# Patient Record
Sex: FEMALE | Race: WHITE | Employment: OTHER | URBAN - METROPOLITAN AREA
[De-identification: names, ages, dates, MRNs, and addresses within clinical notes are randomized per-mention and may not be internally consistent; named-entity substitution may affect disease eponyms.]

---

## 2017-07-13 PROBLEM — Z95.2 S/P AVR (AORTIC VALVE REPLACEMENT): Status: ACTIVE | Noted: 2017-07-13

## 2017-07-13 PROBLEM — I10 ESSENTIAL HYPERTENSION: Status: ACTIVE | Noted: 2017-07-13

## 2017-07-13 PROBLEM — I25.10 CAD (CORONARY ARTERY DISEASE): Status: ACTIVE | Noted: 2017-07-13

## 2017-07-13 PROBLEM — E03.9 ACQUIRED HYPOTHYROIDISM: Status: ACTIVE | Noted: 2017-07-13

## 2017-07-13 PROBLEM — E66.9 OBESITY: Status: ACTIVE | Noted: 2017-07-13

## 2017-07-13 PROBLEM — I49.3 FREQUENT PVCS: Status: ACTIVE | Noted: 2017-07-13

## 2019-03-06 ENCOUNTER — APPOINTMENT (OUTPATIENT)
Dept: CT IMAGING | Age: 74
DRG: 683 | End: 2019-03-06
Payer: MEDICARE

## 2019-03-06 ENCOUNTER — HOSPITAL ENCOUNTER (INPATIENT)
Age: 74
LOS: 2 days | Discharge: HOME | DRG: 683 | End: 2019-03-10
Attending: EMERGENCY MEDICINE | Admitting: INTERNAL MEDICINE
Payer: MEDICARE

## 2019-03-06 ENCOUNTER — APPOINTMENT (OUTPATIENT)
Dept: GENERAL RADIOLOGY | Age: 74
DRG: 683 | End: 2019-03-06
Payer: MEDICARE

## 2019-03-06 DIAGNOSIS — N17.9 ACUTE RENAL FAILURE SUPERIMPOSED ON CHRONIC KIDNEY DISEASE, UNSPECIFIED CKD STAGE, UNSPECIFIED ACUTE RENAL FAILURE TYPE (HCC): Primary | ICD-10-CM

## 2019-03-06 DIAGNOSIS — N18.9 ACUTE RENAL FAILURE SUPERIMPOSED ON CHRONIC KIDNEY DISEASE, UNSPECIFIED CKD STAGE, UNSPECIFIED ACUTE RENAL FAILURE TYPE (HCC): Primary | ICD-10-CM

## 2019-03-06 LAB
ALBUMIN SERPL-MCNC: 4.1 G/DL (ref 3.5–5.2)
ALP BLD-CCNC: 66 U/L (ref 35–104)
ALT SERPL-CCNC: 11 U/L (ref 0–32)
AMMONIA: 31.8 UMOL/L (ref 11–51)
ANION GAP SERPL CALCULATED.3IONS-SCNC: 14 MMOL/L (ref 7–16)
APTT: 32.7 SEC (ref 24.5–35.1)
AST SERPL-CCNC: 14 U/L (ref 0–31)
BASOPHILS ABSOLUTE: 0.02 E9/L (ref 0–0.2)
BASOPHILS RELATIVE PERCENT: 0.3 % (ref 0–2)
BILIRUB SERPL-MCNC: 0.3 MG/DL (ref 0–1.2)
BILIRUBIN DIRECT: <0.2 MG/DL (ref 0–0.3)
BILIRUBIN URINE: NEGATIVE
BILIRUBIN, INDIRECT: NORMAL MG/DL (ref 0–1)
BLOOD, URINE: NEGATIVE
BUN BLDV-MCNC: 40 MG/DL (ref 8–23)
CALCIUM SERPL-MCNC: 9.8 MG/DL (ref 8.6–10.2)
CHLORIDE BLD-SCNC: 93 MMOL/L (ref 98–107)
CLARITY: CLEAR
CO2: 25 MMOL/L (ref 22–29)
COLOR: YELLOW
CREAT SERPL-MCNC: 2.6 MG/DL (ref 0.5–1)
EOSINOPHILS ABSOLUTE: 0.43 E9/L (ref 0.05–0.5)
EOSINOPHILS RELATIVE PERCENT: 6.8 % (ref 0–6)
GFR AFRICAN AMERICAN: 22
GFR NON-AFRICAN AMERICAN: 18 ML/MIN/1.73
GLUCOSE BLD-MCNC: 104 MG/DL (ref 74–99)
GLUCOSE URINE: NEGATIVE MG/DL
HCT VFR BLD CALC: 39.3 % (ref 34–48)
HEMOGLOBIN: 12.4 G/DL (ref 11.5–15.5)
IMMATURE GRANULOCYTES #: 0.04 E9/L
IMMATURE GRANULOCYTES %: 0.6 % (ref 0–5)
INFLUENZA A BY PCR: NOT DETECTED
INFLUENZA B BY PCR: NOT DETECTED
INR BLD: 2
KETONES, URINE: NEGATIVE MG/DL
LACTIC ACID: 1.1 MMOL/L (ref 0.5–2.2)
LEUKOCYTE ESTERASE, URINE: NEGATIVE
LIPASE: 26 U/L (ref 13–60)
LYMPHOCYTES ABSOLUTE: 0.51 E9/L (ref 1.5–4)
LYMPHOCYTES RELATIVE PERCENT: 8 % (ref 20–42)
MAGNESIUM: 2.3 MG/DL (ref 1.6–2.6)
MCH RBC QN AUTO: 28.5 PG (ref 26–35)
MCHC RBC AUTO-ENTMCNC: 31.6 % (ref 32–34.5)
MCV RBC AUTO: 90.3 FL (ref 80–99.9)
MONOCYTES ABSOLUTE: 0.41 E9/L (ref 0.1–0.95)
MONOCYTES RELATIVE PERCENT: 6.4 % (ref 2–12)
NEUTROPHILS ABSOLUTE: 4.95 E9/L (ref 1.8–7.3)
NEUTROPHILS RELATIVE PERCENT: 77.9 % (ref 43–80)
NITRITE, URINE: NEGATIVE
OVALOCYTES: ABNORMAL
PDW BLD-RTO: 15.4 FL (ref 11.5–15)
PH UA: 6.5 (ref 5–9)
PLATELET # BLD: 284 E9/L (ref 130–450)
PMV BLD AUTO: 10.8 FL (ref 7–12)
POIKILOCYTES: ABNORMAL
POLYCHROMASIA: ABNORMAL
POTASSIUM REFLEX MAGNESIUM: 4.3 MMOL/L (ref 3.5–5)
PROTEIN UA: NEGATIVE MG/DL
PROTHROMBIN TIME: 23 SEC (ref 9.3–12.4)
RBC # BLD: 4.35 E12/L (ref 3.5–5.5)
SODIUM BLD-SCNC: 132 MMOL/L (ref 132–146)
SPECIFIC GRAVITY UA: 1.01 (ref 1–1.03)
TOTAL PROTEIN: 7.8 G/DL (ref 6.4–8.3)
TROPONIN: <0.01 NG/ML (ref 0–0.03)
TSH SERPL DL<=0.05 MIU/L-ACNC: 0.71 UIU/ML (ref 0.27–4.2)
UROBILINOGEN, URINE: 0.2 E.U./DL
WBC # BLD: 6.4 E9/L (ref 4.5–11.5)

## 2019-03-06 PROCEDURE — 99285 EMERGENCY DEPT VISIT HI MDM: CPT

## 2019-03-06 PROCEDURE — 99219 PR INITIAL OBSERVATION CARE/DAY 50 MINUTES: CPT | Performed by: INTERNAL MEDICINE

## 2019-03-06 PROCEDURE — G0378 HOSPITAL OBSERVATION PER HR: HCPCS

## 2019-03-06 PROCEDURE — 81003 URINALYSIS AUTO W/O SCOPE: CPT

## 2019-03-06 PROCEDURE — 83735 ASSAY OF MAGNESIUM: CPT

## 2019-03-06 PROCEDURE — 85610 PROTHROMBIN TIME: CPT

## 2019-03-06 PROCEDURE — 87502 INFLUENZA DNA AMP PROBE: CPT

## 2019-03-06 PROCEDURE — 80076 HEPATIC FUNCTION PANEL: CPT

## 2019-03-06 PROCEDURE — 85025 COMPLETE CBC W/AUTO DIFF WBC: CPT

## 2019-03-06 PROCEDURE — 70450 CT HEAD/BRAIN W/O DYE: CPT

## 2019-03-06 PROCEDURE — 85730 THROMBOPLASTIN TIME PARTIAL: CPT

## 2019-03-06 PROCEDURE — 80048 BASIC METABOLIC PNL TOTAL CA: CPT

## 2019-03-06 PROCEDURE — 83690 ASSAY OF LIPASE: CPT

## 2019-03-06 PROCEDURE — 71045 X-RAY EXAM CHEST 1 VIEW: CPT

## 2019-03-06 PROCEDURE — 82140 ASSAY OF AMMONIA: CPT

## 2019-03-06 PROCEDURE — 83605 ASSAY OF LACTIC ACID: CPT

## 2019-03-06 PROCEDURE — 84484 ASSAY OF TROPONIN QUANT: CPT

## 2019-03-06 PROCEDURE — 87088 URINE BACTERIA CULTURE: CPT

## 2019-03-06 PROCEDURE — 84443 ASSAY THYROID STIM HORMONE: CPT

## 2019-03-06 PROCEDURE — 93005 ELECTROCARDIOGRAM TRACING: CPT | Performed by: EMERGENCY MEDICINE

## 2019-03-06 ASSESSMENT — PAIN SCALES - GENERAL: PAINLEVEL_OUTOF10: 0

## 2019-03-07 PROBLEM — R26.9 ABNORMAL GAIT: Status: ACTIVE | Noted: 2019-03-07

## 2019-03-07 PROBLEM — E11.22 TYPE 2 DIABETES MELLITUS WITH CHRONIC KIDNEY DISEASE, WITH LONG-TERM CURRENT USE OF INSULIN (HCC): Chronic | Status: ACTIVE | Noted: 2019-03-06

## 2019-03-07 PROBLEM — Z79.4 TYPE 2 DIABETES MELLITUS WITH CHRONIC KIDNEY DISEASE, WITH LONG-TERM CURRENT USE OF INSULIN (HCC): Chronic | Status: ACTIVE | Noted: 2019-03-06

## 2019-03-07 PROBLEM — I48.0 PAROXYSMAL ATRIAL FIBRILLATION (HCC): Chronic | Status: ACTIVE | Noted: 2019-03-06

## 2019-03-07 PROBLEM — E78.5 HYPERLIPIDEMIA: Chronic | Status: ACTIVE | Noted: 2019-03-06

## 2019-03-07 PROBLEM — N18.9 ACUTE KIDNEY INJURY SUPERIMPOSED ON CKD (HCC): Status: ACTIVE | Noted: 2019-03-06

## 2019-03-07 PROBLEM — E03.9 ACQUIRED HYPOTHYROIDISM: Chronic | Status: ACTIVE | Noted: 2017-07-13

## 2019-03-07 PROBLEM — I51.3 INTRACARDIAC THROMBUS: Chronic | Status: ACTIVE | Noted: 2019-03-06

## 2019-03-07 PROBLEM — I10 ESSENTIAL HYPERTENSION: Chronic | Status: ACTIVE | Noted: 2017-07-13

## 2019-03-07 PROBLEM — M10.9 GOUT: Chronic | Status: ACTIVE | Noted: 2019-03-06

## 2019-03-07 LAB
ANION GAP SERPL CALCULATED.3IONS-SCNC: 16 MMOL/L (ref 7–16)
BASOPHILS ABSOLUTE: 0.02 E9/L (ref 0–0.2)
BASOPHILS RELATIVE PERCENT: 0.4 % (ref 0–2)
BUN BLDV-MCNC: 40 MG/DL (ref 8–23)
CALCIUM SERPL-MCNC: 9.6 MG/DL (ref 8.6–10.2)
CHLORIDE BLD-SCNC: 93 MMOL/L (ref 98–107)
CO2: 22 MMOL/L (ref 22–29)
CREAT SERPL-MCNC: 2.6 MG/DL (ref 0.5–1)
EKG ATRIAL RATE: 69 BPM
EKG P AXIS: 28 DEGREES
EKG P-R INTERVAL: 202 MS
EKG Q-T INTERVAL: 396 MS
EKG QRS DURATION: 116 MS
EKG QTC CALCULATION (BAZETT): 424 MS
EKG R AXIS: 36 DEGREES
EKG T AXIS: 90 DEGREES
EKG VENTRICULAR RATE: 69 BPM
EOSINOPHILS ABSOLUTE: 0.39 E9/L (ref 0.05–0.5)
EOSINOPHILS RELATIVE PERCENT: 7.7 % (ref 0–6)
GFR AFRICAN AMERICAN: 22
GFR NON-AFRICAN AMERICAN: 18 ML/MIN/1.73
GLUCOSE BLD-MCNC: 134 MG/DL (ref 74–99)
HCT VFR BLD CALC: 37.8 % (ref 34–48)
HEMOGLOBIN: 12.1 G/DL (ref 11.5–15.5)
IMMATURE GRANULOCYTES #: 0.04 E9/L
IMMATURE GRANULOCYTES %: 0.8 % (ref 0–5)
INR BLD: 2.2
LYMPHOCYTES ABSOLUTE: 0.67 E9/L (ref 1.5–4)
LYMPHOCYTES RELATIVE PERCENT: 13.3 % (ref 20–42)
MAGNESIUM: 2.2 MG/DL (ref 1.6–2.6)
MCH RBC QN AUTO: 28.7 PG (ref 26–35)
MCHC RBC AUTO-ENTMCNC: 32 % (ref 32–34.5)
MCV RBC AUTO: 89.6 FL (ref 80–99.9)
METER GLUCOSE: 132 MG/DL (ref 74–99)
METER GLUCOSE: 181 MG/DL (ref 74–99)
MONOCYTES ABSOLUTE: 0.38 E9/L (ref 0.1–0.95)
MONOCYTES RELATIVE PERCENT: 7.5 % (ref 2–12)
NEUTROPHILS ABSOLUTE: 3.55 E9/L (ref 1.8–7.3)
NEUTROPHILS RELATIVE PERCENT: 70.3 % (ref 43–80)
PDW BLD-RTO: 15.5 FL (ref 11.5–15)
PLATELET # BLD: 249 E9/L (ref 130–450)
PMV BLD AUTO: 10.5 FL (ref 7–12)
POTASSIUM SERPL-SCNC: 4.2 MMOL/L (ref 3.5–5)
PROTHROMBIN TIME: 24.4 SEC (ref 9.3–12.4)
RBC # BLD: 4.22 E12/L (ref 3.5–5.5)
SODIUM BLD-SCNC: 131 MMOL/L (ref 132–146)
WBC # BLD: 5.1 E9/L (ref 4.5–11.5)

## 2019-03-07 PROCEDURE — 85025 COMPLETE CBC W/AUTO DIFF WBC: CPT

## 2019-03-07 PROCEDURE — 6370000000 HC RX 637 (ALT 250 FOR IP): Performed by: INTERNAL MEDICINE

## 2019-03-07 PROCEDURE — 2580000003 HC RX 258: Performed by: INTERNAL MEDICINE

## 2019-03-07 PROCEDURE — 36415 COLL VENOUS BLD VENIPUNCTURE: CPT

## 2019-03-07 PROCEDURE — 82962 GLUCOSE BLOOD TEST: CPT

## 2019-03-07 PROCEDURE — 83735 ASSAY OF MAGNESIUM: CPT

## 2019-03-07 PROCEDURE — 80048 BASIC METABOLIC PNL TOTAL CA: CPT

## 2019-03-07 PROCEDURE — G0378 HOSPITAL OBSERVATION PER HR: HCPCS

## 2019-03-07 PROCEDURE — 85610 PROTHROMBIN TIME: CPT

## 2019-03-07 PROCEDURE — 99225 PR SBSQ OBSERVATION CARE/DAY 25 MINUTES: CPT | Performed by: INTERNAL MEDICINE

## 2019-03-07 RX ORDER — HYDRALAZINE HYDROCHLORIDE 10 MG/1
10 TABLET, FILM COATED ORAL 3 TIMES DAILY
Status: DISCONTINUED | OUTPATIENT
Start: 2019-03-07 | End: 2019-03-09

## 2019-03-07 RX ORDER — ACETAMINOPHEN 325 MG/1
650 TABLET ORAL EVERY 4 HOURS PRN
Status: DISCONTINUED | OUTPATIENT
Start: 2019-03-07 | End: 2019-03-10 | Stop reason: HOSPADM

## 2019-03-07 RX ORDER — SODIUM CHLORIDE 0.9 % (FLUSH) 0.9 %
10 SYRINGE (ML) INJECTION EVERY 12 HOURS SCHEDULED
Status: DISCONTINUED | OUTPATIENT
Start: 2019-03-07 | End: 2019-03-10 | Stop reason: HOSPADM

## 2019-03-07 RX ORDER — WARFARIN SODIUM 3 MG/1
3 TABLET ORAL
Status: DISCONTINUED | OUTPATIENT
Start: 2019-03-08 | End: 2019-03-10 | Stop reason: HOSPADM

## 2019-03-07 RX ORDER — INSULIN GLARGINE 100 [IU]/ML
30 INJECTION, SOLUTION SUBCUTANEOUS 2 TIMES DAILY
Status: DISCONTINUED | OUTPATIENT
Start: 2019-03-07 | End: 2019-03-10 | Stop reason: HOSPADM

## 2019-03-07 RX ORDER — WARFARIN SODIUM 2 MG/1
2 TABLET ORAL
Status: ON HOLD | COMMUNITY
End: 2019-08-21 | Stop reason: HOSPADM

## 2019-03-07 RX ORDER — FLUOXETINE HYDROCHLORIDE 20 MG/1
20 CAPSULE ORAL DAILY
Status: DISCONTINUED | OUTPATIENT
Start: 2019-03-07 | End: 2019-03-10 | Stop reason: HOSPADM

## 2019-03-07 RX ORDER — FENOFIBRATE 160 MG/1
160 TABLET ORAL DAILY
Status: DISCONTINUED | OUTPATIENT
Start: 2019-03-07 | End: 2019-03-09

## 2019-03-07 RX ORDER — ONDANSETRON 2 MG/ML
4 INJECTION INTRAMUSCULAR; INTRAVENOUS EVERY 6 HOURS PRN
Status: DISCONTINUED | OUTPATIENT
Start: 2019-03-07 | End: 2019-03-10 | Stop reason: HOSPADM

## 2019-03-07 RX ORDER — CETIRIZINE HYDROCHLORIDE 10 MG/1
5 TABLET ORAL DAILY
Status: DISCONTINUED | OUTPATIENT
Start: 2019-03-07 | End: 2019-03-10 | Stop reason: HOSPADM

## 2019-03-07 RX ORDER — DOCUSATE SODIUM 100 MG/1
500 CAPSULE, LIQUID FILLED ORAL DAILY
Status: DISCONTINUED | OUTPATIENT
Start: 2019-03-07 | End: 2019-03-10 | Stop reason: HOSPADM

## 2019-03-07 RX ORDER — PANTOPRAZOLE SODIUM 40 MG/1
40 TABLET, DELAYED RELEASE ORAL
Status: DISCONTINUED | OUTPATIENT
Start: 2019-03-07 | End: 2019-03-10 | Stop reason: HOSPADM

## 2019-03-07 RX ORDER — WARFARIN SODIUM 2 MG/1
3 TABLET ORAL
Status: ON HOLD | COMMUNITY
End: 2019-08-21 | Stop reason: HOSPADM

## 2019-03-07 RX ORDER — CLOPIDOGREL BISULFATE 75 MG/1
75 TABLET ORAL DAILY
Status: DISCONTINUED | OUTPATIENT
Start: 2019-03-07 | End: 2019-03-10 | Stop reason: HOSPADM

## 2019-03-07 RX ORDER — WARFARIN SODIUM 2 MG/1
2 TABLET ORAL
Status: DISCONTINUED | OUTPATIENT
Start: 2019-03-07 | End: 2019-03-10 | Stop reason: HOSPADM

## 2019-03-07 RX ORDER — SODIUM CHLORIDE 0.9 % (FLUSH) 0.9 %
10 SYRINGE (ML) INJECTION PRN
Status: DISCONTINUED | OUTPATIENT
Start: 2019-03-07 | End: 2019-03-10 | Stop reason: HOSPADM

## 2019-03-07 RX ORDER — SODIUM CHLORIDE 9 MG/ML
INJECTION, SOLUTION INTRAVENOUS CONTINUOUS
Status: DISCONTINUED | OUTPATIENT
Start: 2019-03-07 | End: 2019-03-08

## 2019-03-07 RX ORDER — LEVOTHYROXINE SODIUM 0.12 MG/1
125 TABLET ORAL DAILY
Status: DISCONTINUED | OUTPATIENT
Start: 2019-03-07 | End: 2019-03-10 | Stop reason: HOSPADM

## 2019-03-07 RX ADMIN — SODIUM CHLORIDE: 9 INJECTION, SOLUTION INTRAVENOUS at 21:56

## 2019-03-07 RX ADMIN — Medication 10 ML: at 09:09

## 2019-03-07 RX ADMIN — METOPROLOL SUCCINATE 75 MG: 50 TABLET, EXTENDED RELEASE ORAL at 10:40

## 2019-03-07 RX ADMIN — INSULIN GLARGINE 30 UNITS: 100 INJECTION, SOLUTION SUBCUTANEOUS at 20:34

## 2019-03-07 RX ADMIN — PANTOPRAZOLE SODIUM 40 MG: 40 TABLET, DELAYED RELEASE ORAL at 05:48

## 2019-03-07 RX ADMIN — FLUOXETINE 20 MG: 20 CAPSULE ORAL at 09:03

## 2019-03-07 RX ADMIN — Medication 10 ML: at 20:31

## 2019-03-07 RX ADMIN — LEVOTHYROXINE SODIUM 125 MCG: 125 TABLET ORAL at 05:48

## 2019-03-07 RX ADMIN — FENOFIBRATE 160 MG: 160 TABLET ORAL at 09:04

## 2019-03-07 RX ADMIN — INSULIN GLARGINE 30 UNITS: 100 INJECTION, SOLUTION SUBCUTANEOUS at 09:06

## 2019-03-07 RX ADMIN — HYDRALAZINE HYDROCHLORIDE 10 MG: 10 TABLET, FILM COATED ORAL at 20:30

## 2019-03-07 RX ADMIN — HYDRALAZINE HYDROCHLORIDE 10 MG: 10 TABLET, FILM COATED ORAL at 10:41

## 2019-03-07 RX ADMIN — DOCUSATE SODIUM 100 MG: 100 CAPSULE, LIQUID FILLED ORAL at 09:05

## 2019-03-07 RX ADMIN — HYDRALAZINE HYDROCHLORIDE 10 MG: 10 TABLET, FILM COATED ORAL at 15:09

## 2019-03-07 RX ADMIN — WARFARIN SODIUM 2 MG: 2 TABLET ORAL at 20:30

## 2019-03-07 RX ADMIN — CLOPIDOGREL BISULFATE 75 MG: 75 TABLET ORAL at 09:04

## 2019-03-07 ASSESSMENT — PAIN SCALES - GENERAL
PAINLEVEL_OUTOF10: 0
PAINLEVEL_OUTOF10: 0
PAINLEVEL_OUTOF10: 2

## 2019-03-07 ASSESSMENT — PAIN DESCRIPTION - LOCATION: LOCATION: RECTUM

## 2019-03-07 ASSESSMENT — PAIN DESCRIPTION - FREQUENCY: FREQUENCY: CONTINUOUS

## 2019-03-07 ASSESSMENT — PAIN DESCRIPTION - DESCRIPTORS: DESCRIPTORS: DULL;DISCOMFORT

## 2019-03-07 ASSESSMENT — PAIN DESCRIPTION - PAIN TYPE: TYPE: CHRONIC PAIN

## 2019-03-08 PROBLEM — N17.9 AKI (ACUTE KIDNEY INJURY) (HCC): Status: ACTIVE | Noted: 2019-03-08

## 2019-03-08 LAB
ANION GAP SERPL CALCULATED.3IONS-SCNC: 9 MMOL/L (ref 7–16)
BASOPHILS ABSOLUTE: 0.02 E9/L (ref 0–0.2)
BASOPHILS RELATIVE PERCENT: 0.4 % (ref 0–2)
BUN BLDV-MCNC: 42 MG/DL (ref 8–23)
CALCIUM SERPL-MCNC: 9.2 MG/DL (ref 8.6–10.2)
CHLORIDE BLD-SCNC: 101 MMOL/L (ref 98–107)
CO2: 24 MMOL/L (ref 22–29)
CREAT SERPL-MCNC: 2.5 MG/DL (ref 0.5–1)
EOSINOPHILS ABSOLUTE: 0.33 E9/L (ref 0.05–0.5)
EOSINOPHILS RELATIVE PERCENT: 7.4 % (ref 0–6)
GFR AFRICAN AMERICAN: 23
GFR NON-AFRICAN AMERICAN: 19 ML/MIN/1.73
GLUCOSE BLD-MCNC: 119 MG/DL (ref 74–99)
HCT VFR BLD CALC: 35.6 % (ref 34–48)
HEMOGLOBIN: 11.1 G/DL (ref 11.5–15.5)
IMMATURE GRANULOCYTES #: 0.04 E9/L
IMMATURE GRANULOCYTES %: 0.9 % (ref 0–5)
INR BLD: 2.4
LV EF: 48 %
LVEF MODALITY: NORMAL
LYMPHOCYTES ABSOLUTE: 1.06 E9/L (ref 1.5–4)
LYMPHOCYTES RELATIVE PERCENT: 23.8 % (ref 20–42)
MCH RBC QN AUTO: 28.2 PG (ref 26–35)
MCHC RBC AUTO-ENTMCNC: 31.2 % (ref 32–34.5)
MCV RBC AUTO: 90.4 FL (ref 80–99.9)
METER GLUCOSE: 139 MG/DL (ref 74–99)
METER GLUCOSE: 156 MG/DL (ref 74–99)
METER GLUCOSE: 165 MG/DL (ref 74–99)
METER GLUCOSE: 184 MG/DL (ref 74–99)
MONOCYTES ABSOLUTE: 0.42 E9/L (ref 0.1–0.95)
MONOCYTES RELATIVE PERCENT: 9.4 % (ref 2–12)
NEUTROPHILS ABSOLUTE: 2.58 E9/L (ref 1.8–7.3)
NEUTROPHILS RELATIVE PERCENT: 58.1 % (ref 43–80)
PDW BLD-RTO: 15.4 FL (ref 11.5–15)
PLATELET # BLD: 230 E9/L (ref 130–450)
PMV BLD AUTO: 10.7 FL (ref 7–12)
POTASSIUM REFLEX MAGNESIUM: 4.5 MMOL/L (ref 3.5–5)
PROTHROMBIN TIME: 26.2 SEC (ref 9.3–12.4)
RBC # BLD: 3.94 E12/L (ref 3.5–5.5)
SODIUM BLD-SCNC: 134 MMOL/L (ref 132–146)
URINE CULTURE, ROUTINE: NORMAL
WBC # BLD: 4.5 E9/L (ref 4.5–11.5)

## 2019-03-08 PROCEDURE — 6370000000 HC RX 637 (ALT 250 FOR IP): Performed by: INTERNAL MEDICINE

## 2019-03-08 PROCEDURE — 6360000004 HC RX CONTRAST MEDICATION: Performed by: INTERNAL MEDICINE

## 2019-03-08 PROCEDURE — 36415 COLL VENOUS BLD VENIPUNCTURE: CPT

## 2019-03-08 PROCEDURE — 99232 SBSQ HOSP IP/OBS MODERATE 35: CPT | Performed by: INTERNAL MEDICINE

## 2019-03-08 PROCEDURE — 2580000003 HC RX 258: Performed by: INTERNAL MEDICINE

## 2019-03-08 PROCEDURE — 85025 COMPLETE CBC W/AUTO DIFF WBC: CPT

## 2019-03-08 PROCEDURE — 93306 TTE W/DOPPLER COMPLETE: CPT

## 2019-03-08 PROCEDURE — 85610 PROTHROMBIN TIME: CPT

## 2019-03-08 PROCEDURE — 82962 GLUCOSE BLOOD TEST: CPT

## 2019-03-08 PROCEDURE — 80048 BASIC METABOLIC PNL TOTAL CA: CPT

## 2019-03-08 PROCEDURE — 1200000000 HC SEMI PRIVATE

## 2019-03-08 RX ADMIN — HYDRALAZINE HYDROCHLORIDE 10 MG: 10 TABLET, FILM COATED ORAL at 08:25

## 2019-03-08 RX ADMIN — Medication 10 ML: at 20:35

## 2019-03-08 RX ADMIN — METOPROLOL SUCCINATE 75 MG: 50 TABLET, EXTENDED RELEASE ORAL at 08:24

## 2019-03-08 RX ADMIN — WARFARIN SODIUM 3 MG: 3 TABLET ORAL at 17:29

## 2019-03-08 RX ADMIN — PERFLUTREN 1.65 MG: 6.52 INJECTION, SUSPENSION INTRAVENOUS at 11:35

## 2019-03-08 RX ADMIN — INSULIN GLARGINE 30 UNITS: 100 INJECTION, SOLUTION SUBCUTANEOUS at 21:28

## 2019-03-08 RX ADMIN — FENOFIBRATE 160 MG: 160 TABLET ORAL at 08:28

## 2019-03-08 RX ADMIN — FLUOXETINE 20 MG: 20 CAPSULE ORAL at 08:27

## 2019-03-08 RX ADMIN — DOCUSATE SODIUM 100 MG: 100 CAPSULE, LIQUID FILLED ORAL at 08:26

## 2019-03-08 RX ADMIN — HYDRALAZINE HYDROCHLORIDE 10 MG: 10 TABLET, FILM COATED ORAL at 20:35

## 2019-03-08 RX ADMIN — INSULIN GLARGINE 30 UNITS: 100 INJECTION, SOLUTION SUBCUTANEOUS at 08:27

## 2019-03-08 RX ADMIN — HYDRALAZINE HYDROCHLORIDE 10 MG: 10 TABLET, FILM COATED ORAL at 15:16

## 2019-03-08 RX ADMIN — LEVOTHYROXINE SODIUM 125 MCG: 125 TABLET ORAL at 06:24

## 2019-03-08 RX ADMIN — PANTOPRAZOLE SODIUM 40 MG: 40 TABLET, DELAYED RELEASE ORAL at 06:24

## 2019-03-08 RX ADMIN — CLOPIDOGREL BISULFATE 75 MG: 75 TABLET ORAL at 08:24

## 2019-03-08 ASSESSMENT — PAIN SCALES - GENERAL: PAINLEVEL_OUTOF10: 0

## 2019-03-09 LAB
ANION GAP SERPL CALCULATED.3IONS-SCNC: 13 MMOL/L (ref 7–16)
ATYPICAL LYMPHOCYTE RELATIVE PERCENT: 5.3 % (ref 0–4)
BASOPHILS ABSOLUTE: 0.05 E9/L (ref 0–0.2)
BASOPHILS RELATIVE PERCENT: 0.9 % (ref 0–2)
BUN BLDV-MCNC: 38 MG/DL (ref 8–23)
CALCIUM SERPL-MCNC: 9.7 MG/DL (ref 8.6–10.2)
CHLORIDE BLD-SCNC: 99 MMOL/L (ref 98–107)
CO2: 23 MMOL/L (ref 22–29)
CREAT SERPL-MCNC: 2.3 MG/DL (ref 0.5–1)
EOSINOPHILS ABSOLUTE: 0.28 E9/L (ref 0.05–0.5)
EOSINOPHILS RELATIVE PERCENT: 5.2 % (ref 0–6)
GFR AFRICAN AMERICAN: 25
GFR NON-AFRICAN AMERICAN: 21 ML/MIN/1.73
GLUCOSE BLD-MCNC: 93 MG/DL (ref 74–99)
HCT VFR BLD CALC: 36.8 % (ref 34–48)
HEMOGLOBIN: 11.6 G/DL (ref 11.5–15.5)
INR BLD: 2.1
LYMPHOCYTES ABSOLUTE: 0.9 E9/L (ref 1.5–4)
LYMPHOCYTES RELATIVE PERCENT: 11.4 % (ref 20–42)
MCH RBC QN AUTO: 28.3 PG (ref 26–35)
MCHC RBC AUTO-ENTMCNC: 31.5 % (ref 32–34.5)
MCV RBC AUTO: 89.8 FL (ref 80–99.9)
METER GLUCOSE: 112 MG/DL (ref 74–99)
METER GLUCOSE: 145 MG/DL (ref 74–99)
METER GLUCOSE: 153 MG/DL (ref 74–99)
METER GLUCOSE: 95 MG/DL (ref 74–99)
MONOCYTES ABSOLUTE: 0.42 E9/L (ref 0.1–0.95)
MONOCYTES RELATIVE PERCENT: 7.9 % (ref 2–12)
NEUTROPHILS ABSOLUTE: 3.66 E9/L (ref 1.8–7.3)
NEUTROPHILS RELATIVE PERCENT: 69.3 % (ref 43–80)
NUCLEATED RED BLOOD CELLS: 0 /100 WBC
OVALOCYTES: ABNORMAL
PDW BLD-RTO: 15.5 FL (ref 11.5–15)
PLATELET # BLD: 255 E9/L (ref 130–450)
PMV BLD AUTO: 10.7 FL (ref 7–12)
POIKILOCYTES: ABNORMAL
POTASSIUM REFLEX MAGNESIUM: 4.8 MMOL/L (ref 3.5–5)
PROTHROMBIN TIME: 23.6 SEC (ref 9.3–12.4)
RBC # BLD: 4.1 E12/L (ref 3.5–5.5)
SODIUM BLD-SCNC: 135 MMOL/L (ref 132–146)
WBC # BLD: 5.3 E9/L (ref 4.5–11.5)

## 2019-03-09 PROCEDURE — 82962 GLUCOSE BLOOD TEST: CPT

## 2019-03-09 PROCEDURE — 85610 PROTHROMBIN TIME: CPT

## 2019-03-09 PROCEDURE — 36415 COLL VENOUS BLD VENIPUNCTURE: CPT

## 2019-03-09 PROCEDURE — 1200000000 HC SEMI PRIVATE

## 2019-03-09 PROCEDURE — 99232 SBSQ HOSP IP/OBS MODERATE 35: CPT | Performed by: INTERNAL MEDICINE

## 2019-03-09 PROCEDURE — 80048 BASIC METABOLIC PNL TOTAL CA: CPT

## 2019-03-09 PROCEDURE — 6370000000 HC RX 637 (ALT 250 FOR IP): Performed by: INTERNAL MEDICINE

## 2019-03-09 PROCEDURE — 85025 COMPLETE CBC W/AUTO DIFF WBC: CPT

## 2019-03-09 PROCEDURE — 2580000003 HC RX 258: Performed by: INTERNAL MEDICINE

## 2019-03-09 RX ORDER — HYDRALAZINE HYDROCHLORIDE 25 MG/1
25 TABLET, FILM COATED ORAL 3 TIMES DAILY
Status: DISCONTINUED | OUTPATIENT
Start: 2019-03-09 | End: 2019-03-10 | Stop reason: HOSPADM

## 2019-03-09 RX ORDER — DEXTROSE MONOHYDRATE 25 G/50ML
12.5 INJECTION, SOLUTION INTRAVENOUS PRN
Status: DISCONTINUED | OUTPATIENT
Start: 2019-03-09 | End: 2019-03-10 | Stop reason: HOSPADM

## 2019-03-09 RX ORDER — NICOTINE POLACRILEX 4 MG
15 LOZENGE BUCCAL PRN
Status: DISCONTINUED | OUTPATIENT
Start: 2019-03-09 | End: 2019-03-10 | Stop reason: HOSPADM

## 2019-03-09 RX ORDER — DEXTROSE MONOHYDRATE 50 MG/ML
100 INJECTION, SOLUTION INTRAVENOUS PRN
Status: DISCONTINUED | OUTPATIENT
Start: 2019-03-09 | End: 2019-03-10 | Stop reason: HOSPADM

## 2019-03-09 RX ADMIN — HYDRALAZINE HYDROCHLORIDE 25 MG: 25 TABLET, FILM COATED ORAL at 14:08

## 2019-03-09 RX ADMIN — METOPROLOL SUCCINATE 75 MG: 50 TABLET, EXTENDED RELEASE ORAL at 08:46

## 2019-03-09 RX ADMIN — PANTOPRAZOLE SODIUM 40 MG: 40 TABLET, DELAYED RELEASE ORAL at 06:23

## 2019-03-09 RX ADMIN — HYDRALAZINE HYDROCHLORIDE 25 MG: 25 TABLET, FILM COATED ORAL at 08:46

## 2019-03-09 RX ADMIN — DOCUSATE SODIUM 500 MG: 100 CAPSULE, LIQUID FILLED ORAL at 08:45

## 2019-03-09 RX ADMIN — Medication 10 ML: at 20:33

## 2019-03-09 RX ADMIN — Medication 10 ML: at 08:45

## 2019-03-09 RX ADMIN — HYDRALAZINE HYDROCHLORIDE 25 MG: 25 TABLET, FILM COATED ORAL at 20:32

## 2019-03-09 RX ADMIN — INSULIN GLARGINE 30 UNITS: 100 INJECTION, SOLUTION SUBCUTANEOUS at 20:34

## 2019-03-09 RX ADMIN — FENOFIBRATE 160 MG: 160 TABLET ORAL at 08:46

## 2019-03-09 RX ADMIN — INSULIN GLARGINE 30 UNITS: 100 INJECTION, SOLUTION SUBCUTANEOUS at 08:46

## 2019-03-09 RX ADMIN — CLOPIDOGREL BISULFATE 75 MG: 75 TABLET ORAL at 08:46

## 2019-03-09 RX ADMIN — WARFARIN SODIUM 2 MG: 2 TABLET ORAL at 18:15

## 2019-03-09 RX ADMIN — LEVOTHYROXINE SODIUM 125 MCG: 125 TABLET ORAL at 06:23

## 2019-03-09 RX ADMIN — FLUOXETINE 20 MG: 20 CAPSULE ORAL at 08:46

## 2019-03-09 RX ADMIN — CETIRIZINE HYDROCHLORIDE 5 MG: 10 TABLET, FILM COATED ORAL at 08:46

## 2019-03-09 ASSESSMENT — ENCOUNTER SYMPTOMS
NAUSEA: 0
DIARRHEA: 0
SHORTNESS OF BREATH: 0
VOMITING: 0
ABDOMINAL DISTENTION: 0
CONSTIPATION: 0
BLOOD IN STOOL: 0
RHINORRHEA: 0
COUGH: 0

## 2019-03-09 ASSESSMENT — PAIN SCALES - GENERAL: PAINLEVEL_OUTOF10: 0

## 2019-03-10 VITALS
BODY MASS INDEX: 30.42 KG/M2 | DIASTOLIC BLOOD PRESSURE: 68 MMHG | OXYGEN SATURATION: 94 % | TEMPERATURE: 97.6 F | HEART RATE: 61 BPM | WEIGHT: 165.3 LBS | SYSTOLIC BLOOD PRESSURE: 162 MMHG | HEIGHT: 62 IN | RESPIRATION RATE: 18 BRPM

## 2019-03-10 LAB
ANION GAP SERPL CALCULATED.3IONS-SCNC: 11 MMOL/L (ref 7–16)
BUN BLDV-MCNC: 39 MG/DL (ref 8–23)
CALCIUM SERPL-MCNC: 9.8 MG/DL (ref 8.6–10.2)
CHLORIDE BLD-SCNC: 103 MMOL/L (ref 98–107)
CO2: 23 MMOL/L (ref 22–29)
CREAT SERPL-MCNC: 2.4 MG/DL (ref 0.5–1)
GFR AFRICAN AMERICAN: 24
GFR NON-AFRICAN AMERICAN: 20 ML/MIN/1.73
GLUCOSE BLD-MCNC: 88 MG/DL (ref 74–99)
INR BLD: 2
METER GLUCOSE: 98 MG/DL (ref 74–99)
POTASSIUM SERPL-SCNC: 4.8 MMOL/L (ref 3.5–5)
PROTHROMBIN TIME: 23 SEC (ref 9.3–12.4)
SODIUM BLD-SCNC: 137 MMOL/L (ref 132–146)

## 2019-03-10 PROCEDURE — 6370000000 HC RX 637 (ALT 250 FOR IP): Performed by: INTERNAL MEDICINE

## 2019-03-10 PROCEDURE — 82962 GLUCOSE BLOOD TEST: CPT

## 2019-03-10 PROCEDURE — 2580000003 HC RX 258: Performed by: INTERNAL MEDICINE

## 2019-03-10 PROCEDURE — 99239 HOSP IP/OBS DSCHRG MGMT >30: CPT | Performed by: INTERNAL MEDICINE

## 2019-03-10 PROCEDURE — APPSS45 APP SPLIT SHARED TIME 31-45 MINUTES: Performed by: PHYSICIAN ASSISTANT

## 2019-03-10 PROCEDURE — 85610 PROTHROMBIN TIME: CPT

## 2019-03-10 PROCEDURE — 80048 BASIC METABOLIC PNL TOTAL CA: CPT

## 2019-03-10 PROCEDURE — 36415 COLL VENOUS BLD VENIPUNCTURE: CPT

## 2019-03-10 RX ORDER — HYDRALAZINE HYDROCHLORIDE 25 MG/1
25 TABLET, FILM COATED ORAL 3 TIMES DAILY
Qty: 90 TABLET | Refills: 3 | Status: SHIPPED | OUTPATIENT
Start: 2019-03-10

## 2019-03-10 RX ORDER — METOPROLOL SUCCINATE 25 MG/1
75 TABLET, EXTENDED RELEASE ORAL DAILY
Qty: 30 TABLET | Refills: 3 | Status: SHIPPED | OUTPATIENT
Start: 2019-03-11

## 2019-03-10 RX ADMIN — Medication 10 ML: at 08:33

## 2019-03-10 RX ADMIN — CLOPIDOGREL BISULFATE 75 MG: 75 TABLET ORAL at 08:32

## 2019-03-10 RX ADMIN — PANTOPRAZOLE SODIUM 40 MG: 40 TABLET, DELAYED RELEASE ORAL at 07:17

## 2019-03-10 RX ADMIN — HYDRALAZINE HYDROCHLORIDE 25 MG: 25 TABLET, FILM COATED ORAL at 14:01

## 2019-03-10 RX ADMIN — INSULIN GLARGINE 30 UNITS: 100 INJECTION, SOLUTION SUBCUTANEOUS at 09:03

## 2019-03-10 RX ADMIN — FLUOXETINE 20 MG: 20 CAPSULE ORAL at 08:32

## 2019-03-10 RX ADMIN — LEVOTHYROXINE SODIUM 125 MCG: 125 TABLET ORAL at 07:16

## 2019-03-10 RX ADMIN — METOPROLOL SUCCINATE 75 MG: 50 TABLET, EXTENDED RELEASE ORAL at 09:08

## 2019-03-10 RX ADMIN — DOCUSATE SODIUM 500 MG: 100 CAPSULE, LIQUID FILLED ORAL at 08:31

## 2019-03-10 RX ADMIN — HYDRALAZINE HYDROCHLORIDE 25 MG: 25 TABLET, FILM COATED ORAL at 08:32

## 2019-03-10 RX ADMIN — CETIRIZINE HYDROCHLORIDE 5 MG: 10 TABLET, FILM COATED ORAL at 08:31

## 2019-08-18 ENCOUNTER — HOSPITAL ENCOUNTER (INPATIENT)
Age: 74
LOS: 3 days | Discharge: HOME | DRG: 291 | End: 2019-08-21
Attending: EMERGENCY MEDICINE | Admitting: INTERNAL MEDICINE
Payer: MEDICARE

## 2019-08-18 ENCOUNTER — APPOINTMENT (OUTPATIENT)
Dept: GENERAL RADIOLOGY | Age: 74
DRG: 291 | End: 2019-08-18
Payer: MEDICARE

## 2019-08-18 DIAGNOSIS — R09.02 HYPOXIA: Primary | ICD-10-CM

## 2019-08-18 DIAGNOSIS — I50.9 ACUTE CONGESTIVE HEART FAILURE, UNSPECIFIED HEART FAILURE TYPE (HCC): ICD-10-CM

## 2019-08-18 LAB
ALBUMIN SERPL-MCNC: 4 G/DL (ref 3.5–5.2)
ALP BLD-CCNC: 136 U/L (ref 35–104)
ALT SERPL-CCNC: 22 U/L (ref 0–32)
ANION GAP SERPL CALCULATED.3IONS-SCNC: 11 MMOL/L (ref 7–16)
APTT: 33.4 SEC (ref 24.5–35.1)
AST SERPL-CCNC: 27 U/L (ref 0–31)
BACTERIA: ABNORMAL /HPF
BASOPHILS ABSOLUTE: 0.04 E9/L (ref 0–0.2)
BASOPHILS RELATIVE PERCENT: 0.6 % (ref 0–2)
BILIRUB SERPL-MCNC: 0.4 MG/DL (ref 0–1.2)
BILIRUBIN URINE: NEGATIVE
BLOOD, URINE: ABNORMAL
BUN BLDV-MCNC: 27 MG/DL (ref 8–23)
CALCIUM SERPL-MCNC: 9.5 MG/DL (ref 8.6–10.2)
CHLORIDE BLD-SCNC: 98 MMOL/L (ref 98–107)
CLARITY: CLEAR
CO2: 25 MMOL/L (ref 22–29)
COLOR: YELLOW
CREAT SERPL-MCNC: 1.3 MG/DL (ref 0.5–1)
EKG ATRIAL RATE: 76 BPM
EKG P AXIS: 51 DEGREES
EKG P-R INTERVAL: 206 MS
EKG Q-T INTERVAL: 416 MS
EKG QRS DURATION: 116 MS
EKG QTC CALCULATION (BAZETT): 468 MS
EKG R AXIS: 98 DEGREES
EKG T AXIS: 101 DEGREES
EKG VENTRICULAR RATE: 76 BPM
EOSINOPHILS ABSOLUTE: 0.19 E9/L (ref 0.05–0.5)
EOSINOPHILS RELATIVE PERCENT: 2.7 % (ref 0–6)
GFR AFRICAN AMERICAN: 48
GFR NON-AFRICAN AMERICAN: 40 ML/MIN/1.73
GLUCOSE BLD-MCNC: 151 MG/DL (ref 74–99)
GLUCOSE URINE: NEGATIVE MG/DL
HCT VFR BLD CALC: 37.4 % (ref 34–48)
HEMOGLOBIN: 11.5 G/DL (ref 11.5–15.5)
IMMATURE GRANULOCYTES #: 0.03 E9/L
IMMATURE GRANULOCYTES %: 0.4 % (ref 0–5)
INR BLD: 2.2
KETONES, URINE: NEGATIVE MG/DL
LEUKOCYTE ESTERASE, URINE: ABNORMAL
LYMPHOCYTES ABSOLUTE: 1.26 E9/L (ref 1.5–4)
LYMPHOCYTES RELATIVE PERCENT: 18.1 % (ref 20–42)
MCH RBC QN AUTO: 25.4 PG (ref 26–35)
MCHC RBC AUTO-ENTMCNC: 30.7 % (ref 32–34.5)
MCV RBC AUTO: 82.7 FL (ref 80–99.9)
METER GLUCOSE: 201 MG/DL (ref 74–99)
MONOCYTES ABSOLUTE: 0.59 E9/L (ref 0.1–0.95)
MONOCYTES RELATIVE PERCENT: 8.5 % (ref 2–12)
NEUTROPHILS ABSOLUTE: 4.86 E9/L (ref 1.8–7.3)
NEUTROPHILS RELATIVE PERCENT: 69.7 % (ref 43–80)
NITRITE, URINE: NEGATIVE
PDW BLD-RTO: 18.3 FL (ref 11.5–15)
PH UA: 6.5 (ref 5–9)
PLATELET # BLD: 191 E9/L (ref 130–450)
PMV BLD AUTO: 11.1 FL (ref 7–12)
POTASSIUM REFLEX MAGNESIUM: 4.1 MMOL/L (ref 3.5–5)
PRO-BNP: 1872 PG/ML (ref 0–450)
PROTEIN UA: 100 MG/DL
PROTHROMBIN TIME: 24.6 SEC (ref 9.3–12.4)
RBC # BLD: 4.52 E12/L (ref 3.5–5.5)
RBC UA: ABNORMAL /HPF (ref 0–2)
SODIUM BLD-SCNC: 134 MMOL/L (ref 132–146)
SPECIFIC GRAVITY UA: 1.01 (ref 1–1.03)
TOTAL PROTEIN: 7.5 G/DL (ref 6.4–8.3)
TROPONIN: <0.01 NG/ML (ref 0–0.03)
UROBILINOGEN, URINE: 0.2 E.U./DL
WBC # BLD: 7 E9/L (ref 4.5–11.5)
WBC UA: ABNORMAL /HPF (ref 0–5)

## 2019-08-18 PROCEDURE — 6370000000 HC RX 637 (ALT 250 FOR IP): Performed by: INTERNAL MEDICINE

## 2019-08-18 PROCEDURE — 82962 GLUCOSE BLOOD TEST: CPT

## 2019-08-18 PROCEDURE — 6370000000 HC RX 637 (ALT 250 FOR IP): Performed by: PHYSICIAN ASSISTANT

## 2019-08-18 PROCEDURE — 1200000000 HC SEMI PRIVATE

## 2019-08-18 PROCEDURE — 99223 1ST HOSP IP/OBS HIGH 75: CPT | Performed by: INTERNAL MEDICINE

## 2019-08-18 PROCEDURE — 85610 PROTHROMBIN TIME: CPT

## 2019-08-18 PROCEDURE — 83880 ASSAY OF NATRIURETIC PEPTIDE: CPT

## 2019-08-18 PROCEDURE — 93010 ELECTROCARDIOGRAM REPORT: CPT | Performed by: INTERNAL MEDICINE

## 2019-08-18 PROCEDURE — 85730 THROMBOPLASTIN TIME PARTIAL: CPT

## 2019-08-18 PROCEDURE — 99285 EMERGENCY DEPT VISIT HI MDM: CPT

## 2019-08-18 PROCEDURE — 6360000002 HC RX W HCPCS: Performed by: PHYSICIAN ASSISTANT

## 2019-08-18 PROCEDURE — 80053 COMPREHEN METABOLIC PANEL: CPT

## 2019-08-18 PROCEDURE — 93005 ELECTROCARDIOGRAM TRACING: CPT | Performed by: STUDENT IN AN ORGANIZED HEALTH CARE EDUCATION/TRAINING PROGRAM

## 2019-08-18 PROCEDURE — 71045 X-RAY EXAM CHEST 1 VIEW: CPT

## 2019-08-18 PROCEDURE — 81001 URINALYSIS AUTO W/SCOPE: CPT

## 2019-08-18 PROCEDURE — 85025 COMPLETE CBC W/AUTO DIFF WBC: CPT

## 2019-08-18 PROCEDURE — 94761 N-INVAS EAR/PLS OXIMETRY MLT: CPT

## 2019-08-18 PROCEDURE — 6360000002 HC RX W HCPCS: Performed by: STUDENT IN AN ORGANIZED HEALTH CARE EDUCATION/TRAINING PROGRAM

## 2019-08-18 PROCEDURE — 96374 THER/PROPH/DIAG INJ IV PUSH: CPT

## 2019-08-18 PROCEDURE — APPSS45 APP SPLIT SHARED TIME 31-45 MINUTES: Performed by: PHYSICIAN ASSISTANT

## 2019-08-18 PROCEDURE — 2580000003 HC RX 258: Performed by: PHYSICIAN ASSISTANT

## 2019-08-18 PROCEDURE — 84484 ASSAY OF TROPONIN QUANT: CPT

## 2019-08-18 RX ORDER — WARFARIN SODIUM 3 MG/1
3 TABLET ORAL
Status: DISCONTINUED | OUTPATIENT
Start: 2019-08-19 | End: 2019-08-20

## 2019-08-18 RX ORDER — BUMETANIDE 1 MG/1
1 TABLET ORAL 3 TIMES DAILY
Status: ON HOLD | COMMUNITY
End: 2019-08-21 | Stop reason: SDUPTHER

## 2019-08-18 RX ORDER — DEXTROSE MONOHYDRATE 25 G/50ML
12.5 INJECTION, SOLUTION INTRAVENOUS PRN
Status: DISCONTINUED | OUTPATIENT
Start: 2019-08-18 | End: 2019-08-21 | Stop reason: HOSPADM

## 2019-08-18 RX ORDER — HYDRALAZINE HYDROCHLORIDE 25 MG/1
25 TABLET, FILM COATED ORAL 3 TIMES DAILY
Status: DISCONTINUED | OUTPATIENT
Start: 2019-08-18 | End: 2019-08-21 | Stop reason: HOSPADM

## 2019-08-18 RX ORDER — COLCHICINE 0.6 MG/1
0.6 TABLET ORAL DAILY PRN
Status: DISCONTINUED | OUTPATIENT
Start: 2019-08-19 | End: 2019-08-21 | Stop reason: HOSPADM

## 2019-08-18 RX ORDER — WARFARIN SODIUM 2 MG/1
2 TABLET ORAL
Status: DISCONTINUED | OUTPATIENT
Start: 2019-08-18 | End: 2019-08-20 | Stop reason: DRUGHIGH

## 2019-08-18 RX ORDER — FENOFIBRATE 160 MG/1
160 TABLET ORAL DAILY
Status: DISCONTINUED | OUTPATIENT
Start: 2019-08-18 | End: 2019-08-21 | Stop reason: HOSPADM

## 2019-08-18 RX ORDER — LORATADINE 10 MG/1
10 CAPSULE, LIQUID FILLED ORAL PRN
Status: DISCONTINUED | OUTPATIENT
Start: 2019-08-18 | End: 2019-08-18 | Stop reason: CLARIF

## 2019-08-18 RX ORDER — WARFARIN SODIUM 2 MG/1
2 TABLET ORAL
Status: DISCONTINUED | OUTPATIENT
Start: 2019-08-18 | End: 2019-08-18

## 2019-08-18 RX ORDER — DEXTROSE MONOHYDRATE 50 MG/ML
100 INJECTION, SOLUTION INTRAVENOUS PRN
Status: DISCONTINUED | OUTPATIENT
Start: 2019-08-18 | End: 2019-08-21 | Stop reason: HOSPADM

## 2019-08-18 RX ORDER — NICOTINE POLACRILEX 4 MG
15 LOZENGE BUCCAL PRN
Status: DISCONTINUED | OUTPATIENT
Start: 2019-08-18 | End: 2019-08-21 | Stop reason: HOSPADM

## 2019-08-18 RX ORDER — ELECTROLYTES/DEXTROSE
1 SOLUTION, ORAL ORAL 2 TIMES DAILY
Status: DISCONTINUED | OUTPATIENT
Start: 2019-08-18 | End: 2019-08-21 | Stop reason: HOSPADM

## 2019-08-18 RX ORDER — ROSUVASTATIN CALCIUM 20 MG/1
40 TABLET, COATED ORAL EVERY EVENING
Status: DISCONTINUED | OUTPATIENT
Start: 2019-08-19 | End: 2019-08-21 | Stop reason: HOSPADM

## 2019-08-18 RX ORDER — CETIRIZINE HYDROCHLORIDE 10 MG/1
5 TABLET ORAL DAILY
Status: DISCONTINUED | OUTPATIENT
Start: 2019-08-18 | End: 2019-08-21 | Stop reason: HOSPADM

## 2019-08-18 RX ORDER — CLOPIDOGREL BISULFATE 75 MG/1
75 TABLET ORAL DAILY
Status: DISCONTINUED | OUTPATIENT
Start: 2019-08-19 | End: 2019-08-21 | Stop reason: HOSPADM

## 2019-08-18 RX ORDER — LEVOTHYROXINE SODIUM 0.12 MG/1
125 TABLET ORAL DAILY
Status: DISCONTINUED | OUTPATIENT
Start: 2019-08-18 | End: 2019-08-21 | Stop reason: HOSPADM

## 2019-08-18 RX ORDER — ONDANSETRON 2 MG/ML
4 INJECTION INTRAMUSCULAR; INTRAVENOUS EVERY 6 HOURS PRN
Status: DISCONTINUED | OUTPATIENT
Start: 2019-08-18 | End: 2019-08-21 | Stop reason: HOSPADM

## 2019-08-18 RX ORDER — OMEPRAZOLE 20 MG/1
20 CAPSULE, DELAYED RELEASE ORAL DAILY
Status: DISCONTINUED | OUTPATIENT
Start: 2019-08-18 | End: 2019-08-18 | Stop reason: CLARIF

## 2019-08-18 RX ORDER — PANTOPRAZOLE SODIUM 40 MG/1
40 TABLET, DELAYED RELEASE ORAL
Status: DISCONTINUED | OUTPATIENT
Start: 2019-08-19 | End: 2019-08-21 | Stop reason: HOSPADM

## 2019-08-18 RX ORDER — WARFARIN SODIUM 2 MG/1
2 TABLET ORAL
Status: DISCONTINUED | OUTPATIENT
Start: 2019-08-20 | End: 2019-08-18

## 2019-08-18 RX ORDER — DOCUSATE SODIUM 100 MG/1
500 CAPSULE, LIQUID FILLED ORAL DAILY
Status: DISCONTINUED | OUTPATIENT
Start: 2019-08-18 | End: 2019-08-21 | Stop reason: HOSPADM

## 2019-08-18 RX ORDER — ACETAMINOPHEN 325 MG/1
650 TABLET ORAL EVERY 4 HOURS PRN
Status: DISCONTINUED | OUTPATIENT
Start: 2019-08-18 | End: 2019-08-21 | Stop reason: HOSPADM

## 2019-08-18 RX ORDER — SODIUM CHLORIDE 0.9 % (FLUSH) 0.9 %
10 SYRINGE (ML) INJECTION PRN
Status: DISCONTINUED | OUTPATIENT
Start: 2019-08-18 | End: 2019-08-21 | Stop reason: HOSPADM

## 2019-08-18 RX ORDER — FLUOXETINE HYDROCHLORIDE 20 MG/1
20 CAPSULE ORAL DAILY
Status: DISCONTINUED | OUTPATIENT
Start: 2019-08-19 | End: 2019-08-21 | Stop reason: HOSPADM

## 2019-08-18 RX ORDER — FUROSEMIDE 10 MG/ML
20 INJECTION INTRAMUSCULAR; INTRAVENOUS 2 TIMES DAILY
Status: DISCONTINUED | OUTPATIENT
Start: 2019-08-18 | End: 2019-08-20

## 2019-08-18 RX ORDER — FUROSEMIDE 10 MG/ML
40 INJECTION INTRAMUSCULAR; INTRAVENOUS ONCE
Status: COMPLETED | OUTPATIENT
Start: 2019-08-18 | End: 2019-08-18

## 2019-08-18 RX ORDER — SODIUM CHLORIDE 0.9 % (FLUSH) 0.9 %
10 SYRINGE (ML) INJECTION EVERY 12 HOURS SCHEDULED
Status: DISCONTINUED | OUTPATIENT
Start: 2019-08-18 | End: 2019-08-21 | Stop reason: HOSPADM

## 2019-08-18 RX ADMIN — WARFARIN SODIUM 2 MG: 2 TABLET ORAL at 18:55

## 2019-08-18 RX ADMIN — CETIRIZINE HYDROCHLORIDE 5 MG: 10 TABLET, FILM COATED ORAL at 18:51

## 2019-08-18 RX ADMIN — FUROSEMIDE 20 MG: 10 INJECTION, SOLUTION INTRAVENOUS at 21:38

## 2019-08-18 RX ADMIN — Medication 1 EACH: at 22:14

## 2019-08-18 RX ADMIN — Medication 10 ML: at 21:38

## 2019-08-18 RX ADMIN — LEVOTHYROXINE SODIUM 125 MCG: 125 TABLET ORAL at 18:51

## 2019-08-18 RX ADMIN — INSULIN LISPRO 2 UNITS: 100 INJECTION, SOLUTION INTRAVENOUS; SUBCUTANEOUS at 21:45

## 2019-08-18 RX ADMIN — FUROSEMIDE 40 MG: 10 INJECTION, SOLUTION INTRAVENOUS at 10:15

## 2019-08-18 RX ADMIN — HYDRALAZINE HYDROCHLORIDE 25 MG: 25 TABLET, FILM COATED ORAL at 18:50

## 2019-08-18 RX ADMIN — FENOFIBRATE 160 MG: 160 TABLET ORAL at 18:51

## 2019-08-18 ASSESSMENT — ENCOUNTER SYMPTOMS
EYE REDNESS: 0
NAUSEA: 0
SINUS PAIN: 0
WHEEZING: 0
ABDOMINAL DISTENTION: 1
CONSTIPATION: 0
SORE THROAT: 0
COUGH: 0
SHORTNESS OF BREATH: 1
DIARRHEA: 0
ABDOMINAL PAIN: 0
VOMITING: 0
EYE PAIN: 0

## 2019-08-18 ASSESSMENT — PAIN SCALES - GENERAL: PAINLEVEL_OUTOF10: 0

## 2019-08-18 NOTE — H&P
[facility] CARE AT [facility]   History and Physical      CHIEF COMPLAINT:  SOB, weight gain     History of Present Illness:  75 y.o. female with a history of stage II diastolic heart failure, DM type 2, HTN, HLD, CAD, history of VTE, AF on chronic warfarin presents with increased SOB and weight gain. Has had increased SOB x 2 weeks, worsening over the last few days. SOB worse with exertion, lying flat. Does not wear supplemental oxygen- was prescribed home oxygen from previous hospital admission, but did not use. Noted about 4-6 pound weight gain with leg swelling over the last week. Denies CP, fever, chills, congestion. No changes in diet. Does not restrict fluids at home, but states she does not drink much fluids. Follows with Dr. Tristen Chaves, cardiologist, as outpatient. Last visit 3/2019. In ED, patient hypoxic on room air. Given IV lasix. 3 L NC, no respiratory distress noted    Informant(s) for H&P: patient, daughters at bedside    REVIEW OF SYSTEMS:  no fevers, chills, cp, sob, n/v, ha, vision/hearing changes, wt changes, hot/cold flashes, other open skin lesions, diarrhea, constipation, dysuria/hematuria unless noted in HPI. Complete ROS performed with the patient and is otherwise negative.       PMH:  Past Medical History:   Diagnosis Date    Bradycardia     CAD (coronary artery disease)     Chest pain     CKD (chronic kidney disease)     DM2 (diabetes mellitus, type 2) (HCC)     on insulin    Gout     H/O aortic valve replacement     cow valve    Heart attack ([facility] [address]     History of heart surgery     triple bypass 1992;  couple yrs ago (swan) aorta valve surgery    History of stress test     Hyperlipidemia     Hypertension     Hypothyroidism     Intracardiac thrombus 10/2018    Left ventricular systolic dysfunction     severe    Paroxysmal atrial fibrillation (HCC)     PVC's (premature ventricular contractions)     Scarlet fever        Surgical History:  Past Surgical History:   Procedure Laterality Date    ABDOMINAL AORTIC ANEURYSM REPAIR      AORTIC VALVE REPLACEMENT      CAROTID ENDARTERECTOMY       SECTION      CHOLECYSTECTOMY  2002    CORONARY ANGIOPLASTY WITH STENT PLACEMENT      CORONARY ARTERY BYPASS GRAFT      open heart surgery    HEMORRHOID SURGERY      SMALL INTESTINE SURGERY      TUBAL LIGATION         Medications Prior to Admission:    Prior to Admission medications    Medication Sig Start Date End Date Taking? Authorizing Provider   bumetanide (BUMEX) 1 MG tablet Take 1 mg by mouth three times daily Indications: monday, wed, fri   Yes Historical Provider, MD   insulin lispro (HUMALOG KWIKPEN) 200 UNIT/ML SOPN pen Inject into the skin 3 times daily (with meals) Indications: sliding scale   Yes Historical Provider, MD   metoprolol succinate (TOPROL XL) 25 MG extended release tablet Take 3 tablets by mouth daily 3/11/19  Yes Sophie Delgado PA-C   hydrALAZINE (APRESOLINE) 25 MG tablet Take 1 tablet by mouth 3 times daily 3/10/19  Yes Sophie Delgado PA-C   warfarin (COUMADIN) 2 MG tablet Take 2 mg by mouth four times a week Tuesday, Thursday, Saturday,    Yes Historical Provider, MD   warfarin (COUMADIN) 2 MG tablet Take 3 mg by mouth three times a week Monday, Wednesday, Friday.    Yes Historical Provider, MD   loratadine (CLARITIN) 10 MG capsule Take 10 mg by mouth as needed   Yes Historical Provider, MD   levothyroxine (SYNTHROID) 125 MCG tablet Take 125 mcg by mouth Daily   Yes Historical Provider, MD   rosuvastatin (CRESTOR) 40 MG tablet Take 40 mg by mouth every evening   Yes Historical Provider, MD   omeprazole (PRILOSEC) 20 MG delayed release capsule Take 20 mg by mouth daily   Yes Historical Provider, MD   colchicine (COLCRYS) 0.6 MG tablet Take 0.6 mg by mouth daily as needed for Pain   Yes Historical Provider, MD   clopidogrel (PLAVIX) 75 MG tablet Take 75 mg by mouth daily   Yes Historical Provider, MD   allopurinol (ZYLOPRIM) 300 MG tablet Take 300 mg by mouth daily as needed    Yes Historical Provider, MD   choline fenofibrate (TRILIPIX) 135 MG CPDR delayed release capsule Take 135 mg by mouth daily   Yes Historical Provider, MD   FLUoxetine (PROZAC) 20 MG capsule Take 20 mg by mouth daily   Yes Historical Provider, MD   insulin detemir (LEVEMIR) 100 UNIT/ML injection vial Inject into the skin Indications: 30 units Qam and 30 units Qpm.  or as directed   Yes Historical Provider, MD   docusate sodium (STOOL SOFTENER) 250 MG capsule Take 500 mg by mouth daily    Historical Provider, MD       Allergies:    Niacin and related; Novolog [insulin aspart]; and Vitamin e    Social History:    reports that she quit smoking about 27 years ago. She has never used smokeless tobacco. She reports that she does not drink alcohol or use drugs.     Family History:       Problem Relation Age of Onset    Cancer Mother     Heart Attack Father     Heart Attack Sister     Other Brother         suicide       PHYSICAL EXAM:  Vitals:  BP (!) 150/72   Pulse 69   Temp 98.8 °F (37.1 °C) (Oral)   Resp 16   Ht 5' 3\" (1.6 m)   Wt 168 lb (76.2 kg)   SpO2 95%   BMI 29.76 kg/m²   General Appearance: alert and oriented to person, place and time, well-developed and well-nourished, in no acute distress  Skin: warm and dry, no rash or erythema  Head: normocephalic and atraumatic  Pulmonary/Chest: 3 L NC, breath sounds diminished at bases, no wheezing or rhonchi noted, no respiratory distress   Cardiovascular: normal rate, normal S1 and S2, no gallops and intact distal pulses  Abdomen: softly distended, non-tender, bowel sounds normal   Extremities: trace bilateral edema, no clubbing or cyanosis   Musculoskeletal: normal range of motion, no joint swelling, deformity or tenderness  Neurologic: cranial nerves grossly intact, speech normal       LABS:  Recent Labs     08/18/19  0852      K 4.1   CL 98   CO2 25   BUN 27*   CREATININE 1.3*   GLUCOSE 151*

## 2019-08-18 NOTE — ED PROVIDER NOTES
Patient is a 43-year-old female presenting with shortness of breath. Patient has a history of bradycardia, CAD, diabetes, hypertension, hyperlipidemia, A. fib, PVCs on warfarin and chronic anticoagulation. Patient presents with shortness of breath that has been ongoing for 1 week. She states that it is worsened over the past 2 days. Is worse with exertion. She states she is gained 4 pounds over the past 2 days and is retaining it in her abdomen. Her abdomen is distended but nontender. She also states she has had difficulty with her stream of urine. No other urinary symptoms. No fever chills. States her shortness of breath is also worse when she lies flat. Her echo and March showed EF of 45 to 50%. She denies recent trauma, recent travel, unilateral leg swelling, recent surgery. She is not normally on oxygen at home is on 3 L.  1 3 L were taken off and she is on room air her oxygen saturation dropped to 89%. She was then placed back on the 3 L and her oxygen saturation increased to 97. Review of Systems   Constitutional: Positive for unexpected weight change. Negative for chills and fever. HENT: Negative for congestion, sinus pain and sore throat. Eyes: Negative for pain and redness. Respiratory: Positive for shortness of breath. Negative for cough and wheezing. Cardiovascular: Positive for leg swelling. Negative for chest pain and palpitations. Gastrointestinal: Positive for abdominal distention. Negative for abdominal pain, constipation, diarrhea, nausea and vomiting. Endocrine: Negative for polyuria. Genitourinary: Positive for decreased urine volume. Negative for difficulty urinating, dysuria, flank pain, frequency and hematuria. Musculoskeletal: Negative for neck pain. Skin: Negative. Neurological: Negative for dizziness, weakness, light-headedness and headaches. Hematological: Negative. Psychiatric/Behavioral: Negative for agitation and confusion. is anticoagulated with with warfarin and Plavix,   No acute changes on EKG. Chest x-ray showed bilateral patchy infiltrates. She was given Lasix 40 IV to assist in diuresis. Patient requires continued workup and management of their symptoms and will be admitted to the hospital for further evaluation and treatment. ED Course as of Aug 18 1452   Sun Aug 18, 2019   0813 EKG: This EKG is signed and interpreted by me. Rate: 76  Rhythm: Sinus  Interpretation: non-specific EKG and Sinus, PVCs  Comparison: stable as compared to patient's most recent EKG      [JA]   0957 Pro-BNP(!): 1,872 [WL]   1003 Pulmonary edema on CXR. Lasix ordered. [JA]   65 Spoke with Dr. Mirella Aden  who agrees to admit the patient.      [WL]      ED Course User Index  [JA] Susana Pena MD  [WL] Fidencio Paulino, DO         --------------------------------------------- PAST HISTORY ---------------------------------------------  Past Medical History:  has a past medical history of Bradycardia, CAD (coronary artery disease), Chest pain, CKD (chronic kidney disease), DM2 (diabetes mellitus, type 2) ([address] Gout, H/O aortic valve replacement, Heart attack ([address] History of heart surgery, History of stress test, Hyperlipidemia, Hypertension, Hypothyroidism, Intracardiac thrombus, Left ventricular systolic dysfunction, Paroxysmal atrial fibrillation ([address] PVC's (premature ventricular contractions), and Scarlet fever. Past Surgical History:  has a past surgical history that includes Small intestine surgery;  section; Tubal ligation; Carotid endarterectomy; Cholecystectomy (); Hemorrhoid surgery (); Abdominal aortic aneurysm repair; Coronary artery bypass graft (); Coronary angioplasty with stent; and Aortic valve replacement. Social History:  reports that she quit smoking about 27 years ago. She has never used smokeless tobacco. She reports that she does not drink alcohol or use drugs.     Family History: family

## 2019-08-19 LAB
ANION GAP SERPL CALCULATED.3IONS-SCNC: 13 MMOL/L (ref 7–16)
BASOPHILS ABSOLUTE: 0.03 E9/L (ref 0–0.2)
BASOPHILS RELATIVE PERCENT: 0.4 % (ref 0–2)
BUN BLDV-MCNC: 28 MG/DL (ref 8–23)
CALCIUM SERPL-MCNC: 9.6 MG/DL (ref 8.6–10.2)
CHLORIDE BLD-SCNC: 99 MMOL/L (ref 98–107)
CHOLESTEROL, TOTAL: 210 MG/DL (ref 0–199)
CO2: 26 MMOL/L (ref 22–29)
CREAT SERPL-MCNC: 1.5 MG/DL (ref 0.5–1)
EOSINOPHILS ABSOLUTE: 0.17 E9/L (ref 0.05–0.5)
EOSINOPHILS RELATIVE PERCENT: 2.5 % (ref 0–6)
GFR AFRICAN AMERICAN: 41
GFR NON-AFRICAN AMERICAN: 34 ML/MIN/1.73
GLUCOSE BLD-MCNC: 123 MG/DL (ref 74–99)
HCT VFR BLD CALC: 35.4 % (ref 34–48)
HDLC SERPL-MCNC: 21 MG/DL
HEMOGLOBIN: 11.1 G/DL (ref 11.5–15.5)
IMMATURE GRANULOCYTES #: 0.03 E9/L
IMMATURE GRANULOCYTES %: 0.4 % (ref 0–5)
LDL CHOLESTEROL CALCULATED: ABNORMAL MG/DL (ref 0–99)
LYMPHOCYTES ABSOLUTE: 1.55 E9/L (ref 1.5–4)
LYMPHOCYTES RELATIVE PERCENT: 23 % (ref 20–42)
MAGNESIUM: 1.7 MG/DL (ref 1.6–2.6)
MCH RBC QN AUTO: 25.5 PG (ref 26–35)
MCHC RBC AUTO-ENTMCNC: 31.4 % (ref 32–34.5)
MCV RBC AUTO: 81.2 FL (ref 80–99.9)
METER GLUCOSE: 125 MG/DL (ref 74–99)
METER GLUCOSE: 134 MG/DL (ref 74–99)
METER GLUCOSE: 150 MG/DL (ref 74–99)
METER GLUCOSE: 169 MG/DL (ref 74–99)
MONOCYTES ABSOLUTE: 0.66 E9/L (ref 0.1–0.95)
MONOCYTES RELATIVE PERCENT: 9.8 % (ref 2–12)
NEUTROPHILS ABSOLUTE: 4.29 E9/L (ref 1.8–7.3)
NEUTROPHILS RELATIVE PERCENT: 63.9 % (ref 43–80)
PDW BLD-RTO: 18.3 FL (ref 11.5–15)
PLATELET # BLD: 180 E9/L (ref 130–450)
PMV BLD AUTO: 10.8 FL (ref 7–12)
POTASSIUM SERPL-SCNC: 4.1 MMOL/L (ref 3.5–5)
RBC # BLD: 4.36 E12/L (ref 3.5–5.5)
SODIUM BLD-SCNC: 138 MMOL/L (ref 132–146)
TRIGL SERPL-MCNC: 626 MG/DL (ref 0–149)
VLDLC SERPL CALC-MCNC: ABNORMAL MG/DL
WBC # BLD: 6.7 E9/L (ref 4.5–11.5)

## 2019-08-19 PROCEDURE — 80061 LIPID PANEL: CPT

## 2019-08-19 PROCEDURE — 2580000003 HC RX 258: Performed by: PHYSICIAN ASSISTANT

## 2019-08-19 PROCEDURE — 36415 COLL VENOUS BLD VENIPUNCTURE: CPT

## 2019-08-19 PROCEDURE — APPSS30 APP SPLIT SHARED TIME 16-30 MINUTES: Performed by: NURSE PRACTITIONER

## 2019-08-19 PROCEDURE — 80048 BASIC METABOLIC PNL TOTAL CA: CPT

## 2019-08-19 PROCEDURE — 6370000000 HC RX 637 (ALT 250 FOR IP): Performed by: PHYSICIAN ASSISTANT

## 2019-08-19 PROCEDURE — 85025 COMPLETE CBC W/AUTO DIFF WBC: CPT

## 2019-08-19 PROCEDURE — 2700000000 HC OXYGEN THERAPY PER DAY

## 2019-08-19 PROCEDURE — 83735 ASSAY OF MAGNESIUM: CPT

## 2019-08-19 PROCEDURE — 1200000000 HC SEMI PRIVATE

## 2019-08-19 PROCEDURE — 6370000000 HC RX 637 (ALT 250 FOR IP): Performed by: INTERNAL MEDICINE

## 2019-08-19 PROCEDURE — 99232 SBSQ HOSP IP/OBS MODERATE 35: CPT | Performed by: INTERNAL MEDICINE

## 2019-08-19 PROCEDURE — 6360000002 HC RX W HCPCS: Performed by: PHYSICIAN ASSISTANT

## 2019-08-19 PROCEDURE — 82962 GLUCOSE BLOOD TEST: CPT

## 2019-08-19 RX ADMIN — FLUOXETINE 20 MG: 20 CAPSULE ORAL at 09:23

## 2019-08-19 RX ADMIN — Medication 10 ML: at 22:18

## 2019-08-19 RX ADMIN — HYDRALAZINE HYDROCHLORIDE 25 MG: 25 TABLET, FILM COATED ORAL at 09:22

## 2019-08-19 RX ADMIN — CETIRIZINE HYDROCHLORIDE 5 MG: 10 TABLET, FILM COATED ORAL at 09:23

## 2019-08-19 RX ADMIN — DOCUSATE SODIUM 500 MG: 100 CAPSULE, LIQUID FILLED ORAL at 09:22

## 2019-08-19 RX ADMIN — HYDRALAZINE HYDROCHLORIDE 25 MG: 25 TABLET, FILM COATED ORAL at 22:12

## 2019-08-19 RX ADMIN — FUROSEMIDE 20 MG: 10 INJECTION, SOLUTION INTRAVENOUS at 22:12

## 2019-08-19 RX ADMIN — METOPROLOL SUCCINATE 75 MG: 50 TABLET, EXTENDED RELEASE ORAL at 09:22

## 2019-08-19 RX ADMIN — Medication 10 ML: at 09:23

## 2019-08-19 RX ADMIN — WARFARIN SODIUM 3 MG: 3 TABLET ORAL at 18:10

## 2019-08-19 RX ADMIN — Medication 1 EACH: at 09:22

## 2019-08-19 RX ADMIN — INSULIN LISPRO 2 UNITS: 100 INJECTION, SOLUTION INTRAVENOUS; SUBCUTANEOUS at 12:17

## 2019-08-19 RX ADMIN — CLOPIDOGREL BISULFATE 75 MG: 75 TABLET ORAL at 09:22

## 2019-08-19 RX ADMIN — LEVOTHYROXINE SODIUM 125 MCG: 125 TABLET ORAL at 06:04

## 2019-08-19 RX ADMIN — Medication 1 EACH: at 22:14

## 2019-08-19 RX ADMIN — FUROSEMIDE 20 MG: 10 INJECTION, SOLUTION INTRAVENOUS at 09:23

## 2019-08-19 RX ADMIN — ROSUVASTATIN CALCIUM 40 MG: 20 TABLET, FILM COATED ORAL at 18:10

## 2019-08-19 RX ADMIN — PANTOPRAZOLE SODIUM 40 MG: 40 TABLET, DELAYED RELEASE ORAL at 06:04

## 2019-08-19 RX ADMIN — HYDRALAZINE HYDROCHLORIDE 25 MG: 25 TABLET, FILM COATED ORAL at 14:15

## 2019-08-19 RX ADMIN — INSULIN LISPRO 1 UNITS: 100 INJECTION, SOLUTION INTRAVENOUS; SUBCUTANEOUS at 22:13

## 2019-08-19 RX ADMIN — FENOFIBRATE 160 MG: 160 TABLET ORAL at 09:22

## 2019-08-19 ASSESSMENT — PAIN SCALES - GENERAL
PAINLEVEL_OUTOF10: 0
PAINLEVEL_OUTOF10: 0

## 2019-08-19 NOTE — CONSULTS
I provided Esequiel Cross with the Heart Failure book, the HF Zones, and the Sodium Content pamphlet. We reviewed the HF zones, signs and symptoms to report on day 1 of onset, medication compliance, daily weights, low sodium diet (label reading)   I advised patient you can reduce the risk for heart failure exacerbations by modifying/controlling the risk factors they have. We discussed self-managed care which includes the following: to take medications as prescribed- to call the doctor with any side effects do not just stop taking the medication, follow a cardiac heart healthy / low sodium diet, do daily weights, exercise regularly- per doctor recommendation and not to smoke. I stressed the importance of informing the cardiologist the first day of onset of signs and symptoms in the \"Yellow Zone\" of the HF Zones. Verbalizes understanding     Echocardiogram / EF: 3/8/2019      Summary   Mild left ventricular concentric hypertrophy noted.   atypical septal motion   stage II diastolic dysfunction   Systolic function low normal with visually estimate EF 45-50%   The left atrium is mild-moderately dilated.   Moderate to severe mitral regurgitation is present.   The bioprosthetic aortic . PG 17 mmHg/MG 10 mm Hg   Mild aortic insufficiency   Moderate pulmonic regurgitation present.     Pro-BNP 1,872    History of: PVC's, scarlet fever, hypothyroidism, HTN, HLD, Heart attack, H/O valve replacement, DM2, CAD, gout     Medications:    clopidogrel  75 mg Oral Daily    docusate sodium  500 mg Oral Daily    FLUoxetine  20 mg Oral Daily    hydrALAZINE  25 mg Oral TID    insulin detemir  30 Units Subcutaneous BID    insulin lispro  0-12 Units Subcutaneous TID WC    insulin lispro  0-6 Units Subcutaneous Nightly    levothyroxine  125 mcg Oral Daily    metoprolol succinate  75 mg Oral Daily    rosuvastatin  40 mg Oral QPM    warfarin  3 mg Oral Once per day on Mon Wed Fri    sodium chloride flush  10 mL Intravenous 2 times

## 2019-08-19 NOTE — PROGRESS NOTES
[facility]   Progress Note    Admitting Date and Time: 8/18/2019  8:01 AM  Admit Dx: Hypoxia [R09.02]  Hypoxia [R09.02]    Subjective:    Patient was admitted with Hypoxia [R09.02]  Hypoxia [R09.02]. Patient seen while walking out of the bathroom. Supplemental oxygen off and lips are pale. Patient denies any complaints of discomfort or chest pain. ROS: denies fever, chills, cp, sob, n/v, HA unless stated above.      clopidogrel  75 mg Oral Daily    docusate sodium  500 mg Oral Daily    FLUoxetine  20 mg Oral Daily    hydrALAZINE  25 mg Oral TID    insulin detemir  30 Units Subcutaneous BID    insulin lispro  0-12 Units Subcutaneous TID WC    insulin lispro  0-6 Units Subcutaneous Nightly    levothyroxine  125 mcg Oral Daily    metoprolol succinate  75 mg Oral Daily    rosuvastatin  40 mg Oral QPM    warfarin  3 mg Oral Once per day on Mon Wed Fri    sodium chloride flush  10 mL Intravenous 2 times per day    furosemide  20 mg Intravenous BID    fenofibrate  160 mg Oral Daily    cetirizine  5 mg Oral Daily    pantoprazole  40 mg Oral QAM AC    warfarin  2 mg Oral Once per day on Sun Tue Thu Sat    Pen Needles  1 each Subcutaneous BID       colchicine 0.6 mg Daily PRN   sodium chloride flush 10 mL PRN   magnesium hydroxide 30 mL Daily PRN   ondansetron 4 mg Q6H PRN   acetaminophen 650 mg Q4H PRN   glucose 15 g PRN   dextrose 12.5 g PRN   glucagon (rDNA) 1 mg PRN   dextrose 100 mL/hr PRN        Objective:    /60   Pulse 77   Temp 97.7 °F (36.5 °C) (Oral)   Resp 18   Ht 5' 3\" (1.6 m)   Wt 168 lb 11.2 oz (76.5 kg)   SpO2 93%   BMI 29.88 kg/m²   General Appearance: well-developed and well nourished, in no acute distress and alert  Skin: warm and dry, no rash or erythema  Head: normocephalic and atraumatic  Eyes: pupils equal, round, and reactive to light and extraocular eye movements intact  ENT: hearing grossly normal bilaterally  Neck: neck supple and non tender without mass   Pulmonary/Chest: decreased breath sounds noted- but CTA bilaterally   Cardiovascular: normal rate, regular rhythm and intact distal pulses  Abdomen: soft, non-tender, non-distended and bowel sounds normal  Extremities: no cyanosis, no clubbing and trace + edema-  bilateral lower extremity   Musculoskeletal: normal range of motion, no joint swelling, deformity or tenderness  Neurologic: no cranial nerve deficit, gait and coordination normal and speech normal      Recent Labs     08/18/19  0852 08/19/19  0329    138   K 4.1 4.1   CL 98 99   CO2 25 26   BUN 27* 28*   CREATININE 1.3* 1.5*   GLUCOSE 151* 123*   CALCIUM 9.5 9.6       Recent Labs     08/18/19  0852 08/19/19  0329   WBC 7.0 6.7   RBC 4.52 4.36   HGB 11.5 11.1*   HCT 37.4 35.4   MCV 82.7 81.2   MCH 25.4* 25.5*   MCHC 30.7* 31.4*   RDW 18.3* 18.3*    180   MPV 11.1 10.8        Radiology:   XR CHEST PORTABLE   Final Result   Low lung volumes bilaterally. No definite focal   consolidation is identified. Stable cardiomegaly. Assessment:    Active Problems:    Hypoxia    Acute on chronic diastolic (congestive) heart failure (HCC)    Hypothyroidism  Resolved Problems:    * No resolved hospital problems. *      Plan:  1. Hypoxia - continues to require O2 - weaning down and thus far tolerating it   2. Acute on Chronic Diastolic CHF - continue diurese with IV Lasix - cardiology on - electrolyte monitored closely and will replace as needed   3. Hypertension - well controlled   4. Hypothyroidism - controlled with medications   5.  Acute Kidney Injury - BUN/creatinine elevated most likely r/t diuresing - will monitor renal function       Electronically signed by WOO Burks - CNP on 8/19/2019 at 9:25 AM

## 2019-08-19 NOTE — CONSULTS
[facility] [address] [place] [address]    Name: Rivas Funez    Age: 75 y.o. Date of Admission: 8/18/2019  8:01 AM    Date of Service: 8/19/2019    Reason for Consultation: CHF    Referring Physician: Dr Skylar Bailey  Primary Care Physician: David Navarrete MD    History of Present Illness: The patient is a 75y.o. year old female with AVR/CAD presenting to the hospital yesterday for increasing SHARPE and SOB at rest, building up over the past 2 weeks. States gained 6 -7 lbs, couldn't sleep the last 2 nights due to SOB and restlessness. Yesterday just climbed the stairs to brush her teeth and had to sit down to catch her breath. Decided to come to ED. Has diuresed after getting lasix and feels much better today. Takes her diuretic MWF and sometimes Sunday, but she states only  Twice last week.      H/O CABG 1992, PCI, TAVR [facility] May 2014, ventricular apical clot 8/2017 started  On warfarin, last echo March 2019  [address] EF 45-50% AVR with PG/MG 17/10 mmHg 2-3+ MR,mild AI mild to mod LAE-.  Hyperlipidemia/triglyeridemia, CKD, DM,HTN     Past Medical History:   Past Medical History:   Diagnosis Date    Bradycardia     CAD (coronary artery disease)     Chest pain     CHF (congestive heart failure) (HCC)     CKD (chronic kidney disease)     DM2 [facility]di[facility], type 2) ([facility]     on insulin    Gout     H/O aortic valve replacement     cow valve    Heart attack ([address]     History of blood transfusion     History of heart surgery     triple bypass 1992;  couple yrs ago (swan) aorta valve surgery    History of stress test     Hx of blood clots     Hyperlipidemia     Hypertension     Hypothyroidism     Intracardiac thrombus 10/2018    Left ventricular systolic dysfunction     severe    Paroxysmal atrial fibrillation (HCC)     PVC's (premature ventricular contractions)     Scarlet fever        Review of Systems:   Constitutional: No fever, chills, sweats  Cardiac: As per HPI  Pulmonary: magnesium hydroxide (MILK OF MAGNESIA) 400 MG/5ML suspension 30 mL  30 mL Oral Daily PRN MAURO Jamison        ondansetron [facility]) injection 4 mg  4 mg Intravenous Q6H PRN MAURO Jamison        acetaminophen (TYLENOL) tablet 650 mg  650 mg Oral Q4H PRN MAURO Jamison        furosemide (LASIX) injection 20 mg  20 mg Intravenous BID MAURO Jamison   20 mg at 08/18/19 2138    fenofibrate tablet 160 mg  160 mg Oral Daily Ofelia Weber, [address]   160 mg at 08/18/19 1851    cetirizine (ZYRTEC) tablet 5 mg  5 mg Oral Daily MAURO Jamison   5 mg at 08/18/19 1851    pantoprazole (PROTONIX) tablet 40 mg  40 mg Oral QAM AC MAURO Jamison   40 mg at 08/19/19 0604    glucose (GLUTOSE) 40 % oral gel 15 g  15 g Oral PRN Peter Hric, DO        dextrose 50 % IV solution  12.5 g Intravenous PRN Peter Hric, DO        glucagon (rDNA) injection 1 mg  1 mg Intramuscular PRN Peter Hric, DO        dextrose 5 % solution  100 mL/hr Intravenous PRN Peter Hric, DO        warfarin (COUMADIN) tablet 2 mg  2 mg Oral Once per day on Sun Tue Thu Sat MAURO Jamison        Pen Needles 1 each  1 each Subcutaneous BID Jose F Brannon MD   1 each at 08/18/19 2214      dextrose         Physical Exam:  /60   Pulse 77   Temp 97.7 °F (36.5 °C) (Oral)   Resp 18   Ht 5' 3\" (1.6 m)   Wt 168 lb 11.2 oz (76.5 kg)   SpO2 93%   BMI 29.88 kg/m²   Weight change: Wt Readings from Last 3 Encounters:   08/19/19 168 lb 11.2 oz (76.5 kg)   03/10/19 165 lb 4.8 oz (75 kg)   07/13/17 168 lb (76.2 kg)     General: Awake, alert, oriented x3, no acute distress  HEENT: Normocephalic and atraumatic, extraocular movements intact, pupils equal and round, moist mucus membranes, sclera anicteric  Neck: No JVD, no carotid bruits, no thyromegaly, no adenopathy  Cardiac: Regular rate and rhythm, normal S1 and physiologically split S2, no S3, no S4. Apical impulse is nondisplaced.   No murmurs, no pericardial rubs, no

## 2019-08-19 NOTE — PROGRESS NOTES
Warfarin Dose INR Heparin or LMWH HBG/HCT PLT Comment   8/18 2 mg 2.2 --- 11.5/37.4 191    8/19 3 mg X --- 11.1/35.4 180                                 Assessment:  · Patient is a 75year old female on chronic anticoagulation for atrial fibrillation and hx of VTE  · Per charting, patient on warfarin 2 mg Tu/Th/Sat/Sun and warfarin 3 mg on Mon/Wed/Fri  · INR goal = 2-3; INR was therapeutic at 2.2 yesterday    Plan:  · Will continue home dose at this time: Warfarin 3 mg tonight  · Daily PT/INR until the INR is stable within the therapeutic range  · Pharmacist will follow and monitor/adjust dosing as necessary    Idalia Maldonado PharmD, [facility]  8/19/2019  1:32 PM

## 2019-08-19 NOTE — PROGRESS NOTES
Nutrition Education    Type and Reason for Visit: Consult, Patient Education(Cardiac Diet Edu)    Nutrition Assessment:  Consult received for Cardiac Diet Education. Chart reviewed and pt seen. Provided verbal and written education on the Cardiac Diet including diet guidelines, foods recommended and foods to avoid and importance of diet compliance post discharge. All questions answered at this time. · Verbally reviewed information with the Patient; no family present at time of visit. · Written educational materials provided. · Contact name and number provided. · Refer to Patient Education activity for more details.     Electronically signed by Yung Licona RD, LD on 8/19/19 at 3:20 PM    Contact Number: ext [phone]

## 2019-08-20 LAB
ANION GAP SERPL CALCULATED.3IONS-SCNC: 10 MMOL/L (ref 7–16)
BUN BLDV-MCNC: 37 MG/DL (ref 8–23)
CALCIUM SERPL-MCNC: 9.7 MG/DL (ref 8.6–10.2)
CHLORIDE BLD-SCNC: 94 MMOL/L (ref 98–107)
CO2: 28 MMOL/L (ref 22–29)
CREAT SERPL-MCNC: 2 MG/DL (ref 0.5–1)
GFR AFRICAN AMERICAN: 29
GFR NON-AFRICAN AMERICAN: 24 ML/MIN/1.73
GLUCOSE BLD-MCNC: 130 MG/DL (ref 74–99)
INR BLD: 1.8
MAGNESIUM: 1.8 MG/DL (ref 1.6–2.6)
METER GLUCOSE: 121 MG/DL (ref 74–99)
METER GLUCOSE: 148 MG/DL (ref 74–99)
METER GLUCOSE: 203 MG/DL (ref 74–99)
METER GLUCOSE: 217 MG/DL (ref 74–99)
POTASSIUM SERPL-SCNC: 4 MMOL/L (ref 3.5–5)
PRO-BNP: 1117 PG/ML (ref 0–450)
PROTHROMBIN TIME: 20.8 SEC (ref 9.3–12.4)
SODIUM BLD-SCNC: 132 MMOL/L (ref 132–146)

## 2019-08-20 PROCEDURE — 1200000000 HC SEMI PRIVATE

## 2019-08-20 PROCEDURE — 36415 COLL VENOUS BLD VENIPUNCTURE: CPT

## 2019-08-20 PROCEDURE — 6370000000 HC RX 637 (ALT 250 FOR IP): Performed by: PHYSICIAN ASSISTANT

## 2019-08-20 PROCEDURE — 6360000002 HC RX W HCPCS: Performed by: PHYSICIAN ASSISTANT

## 2019-08-20 PROCEDURE — 2580000003 HC RX 258: Performed by: PHYSICIAN ASSISTANT

## 2019-08-20 PROCEDURE — 83880 ASSAY OF NATRIURETIC PEPTIDE: CPT

## 2019-08-20 PROCEDURE — 2700000000 HC OXYGEN THERAPY PER DAY

## 2019-08-20 PROCEDURE — 6370000000 HC RX 637 (ALT 250 FOR IP): Performed by: INTERNAL MEDICINE

## 2019-08-20 PROCEDURE — 85610 PROTHROMBIN TIME: CPT

## 2019-08-20 PROCEDURE — 99232 SBSQ HOSP IP/OBS MODERATE 35: CPT | Performed by: INTERNAL MEDICINE

## 2019-08-20 PROCEDURE — APPSS30 APP SPLIT SHARED TIME 16-30 MINUTES: Performed by: NURSE PRACTITIONER

## 2019-08-20 PROCEDURE — 82962 GLUCOSE BLOOD TEST: CPT

## 2019-08-20 PROCEDURE — 83735 ASSAY OF MAGNESIUM: CPT

## 2019-08-20 PROCEDURE — 80048 BASIC METABOLIC PNL TOTAL CA: CPT

## 2019-08-20 RX ORDER — BUMETANIDE 1 MG/1
1 TABLET ORAL EVERY OTHER DAY
Status: DISCONTINUED | OUTPATIENT
Start: 2019-08-20 | End: 2019-08-21 | Stop reason: HOSPADM

## 2019-08-20 RX ORDER — WARFARIN SODIUM 3 MG/1
3 TABLET ORAL
Status: COMPLETED | OUTPATIENT
Start: 2019-08-20 | End: 2019-08-20

## 2019-08-20 RX ADMIN — METOPROLOL SUCCINATE 75 MG: 50 TABLET, EXTENDED RELEASE ORAL at 08:27

## 2019-08-20 RX ADMIN — HYDRALAZINE HYDROCHLORIDE 25 MG: 25 TABLET, FILM COATED ORAL at 21:16

## 2019-08-20 RX ADMIN — CLOPIDOGREL BISULFATE 75 MG: 75 TABLET ORAL at 08:27

## 2019-08-20 RX ADMIN — INSULIN LISPRO 2 UNITS: 100 INJECTION, SOLUTION INTRAVENOUS; SUBCUTANEOUS at 16:56

## 2019-08-20 RX ADMIN — WARFARIN SODIUM 3 MG: 3 TABLET ORAL at 17:55

## 2019-08-20 RX ADMIN — CETIRIZINE HYDROCHLORIDE 5 MG: 10 TABLET, FILM COATED ORAL at 08:28

## 2019-08-20 RX ADMIN — DOCUSATE SODIUM 500 MG: 100 CAPSULE, LIQUID FILLED ORAL at 08:27

## 2019-08-20 RX ADMIN — FENOFIBRATE 160 MG: 160 TABLET ORAL at 08:27

## 2019-08-20 RX ADMIN — Medication 10 ML: at 08:28

## 2019-08-20 RX ADMIN — LEVOTHYROXINE SODIUM 125 MCG: 125 TABLET ORAL at 06:03

## 2019-08-20 RX ADMIN — INSULIN LISPRO 2 UNITS: 100 INJECTION, SOLUTION INTRAVENOUS; SUBCUTANEOUS at 21:18

## 2019-08-20 RX ADMIN — HYDRALAZINE HYDROCHLORIDE 25 MG: 25 TABLET, FILM COATED ORAL at 08:27

## 2019-08-20 RX ADMIN — FLUOXETINE 20 MG: 20 CAPSULE ORAL at 08:27

## 2019-08-20 RX ADMIN — PANTOPRAZOLE SODIUM 40 MG: 40 TABLET, DELAYED RELEASE ORAL at 06:03

## 2019-08-20 RX ADMIN — Medication 10 ML: at 21:17

## 2019-08-20 RX ADMIN — ROSUVASTATIN CALCIUM 40 MG: 20 TABLET, FILM COATED ORAL at 17:55

## 2019-08-20 RX ADMIN — INSULIN LISPRO 4 UNITS: 100 INJECTION, SOLUTION INTRAVENOUS; SUBCUTANEOUS at 12:21

## 2019-08-20 RX ADMIN — Medication 1 EACH: at 21:18

## 2019-08-20 RX ADMIN — HYDRALAZINE HYDROCHLORIDE 25 MG: 25 TABLET, FILM COATED ORAL at 14:37

## 2019-08-20 RX ADMIN — Medication 1 EACH: at 08:28

## 2019-08-20 RX ADMIN — FUROSEMIDE 20 MG: 10 INJECTION, SOLUTION INTRAVENOUS at 08:27

## 2019-08-20 ASSESSMENT — PAIN SCALES - GENERAL
PAINLEVEL_OUTOF10: 0

## 2019-08-20 NOTE — PATIENT CARE CONFERENCE
[facility] Quality Flow/Interdisciplinary Rounds Progress Note        Quality Flow Rounds held on August 20, 2019    Disciplines Attending:  Bedside Nurse, ,  and Nursing Unit Leadership    Kathy Duval was admitted on 8/18/2019  8:01 AM    Anticipated Discharge Date:  Expected Discharge Date: 08/20/19    Disposition:    Franklin Score:  Franklin Scale Score: 19    Readmission Score:         Discussed patient goal for the day, patient clinical progression, and barriers to discharge.   The following Goal(s) of the Day/Commitment(s) have been identified:  Monitor BNP, wean O2, Ambulating SpO2       Theodor Persons  August 20, 2019

## 2019-08-20 NOTE — PROGRESS NOTES
[facility] CARE AT [facility]   Progress Note    Admitting Date and Time: 8/18/2019  8:01 AM  Admit Dx: Hypoxia [R09.02]  Hypoxia [R09.02]    Subjective:    Patient was admitted with Hypoxia [R09.02]  Hypoxia [R09.02]. Patient still requiring supplemental oxygen. Patient denies any complaints of discomfort. I will order a ambulatory pulse ox. ROS: denies fever, chills, cp, sob, n/v, HA unless stated above.      bumetanide  1 mg Oral Every Other Day    warfarin  3 mg Oral Once    [START ON 8/21/2019] warfarin (COUMADIN) daily dosing (placeholder)   Other RX Placeholder    clopidogrel  75 mg Oral Daily    docusate sodium  500 mg Oral Daily    FLUoxetine  20 mg Oral Daily    hydrALAZINE  25 mg Oral TID    insulin detemir  30 Units Subcutaneous BID    insulin lispro  0-12 Units Subcutaneous TID WC    insulin lispro  0-6 Units Subcutaneous Nightly    levothyroxine  125 mcg Oral Daily    metoprolol succinate  75 mg Oral Daily    rosuvastatin  40 mg Oral QPM    sodium chloride flush  10 mL Intravenous 2 times per day    fenofibrate  160 mg Oral Daily    cetirizine  5 mg Oral Daily    pantoprazole  40 mg Oral QAM AC    Pen Needles  1 each Subcutaneous BID       colchicine 0.6 mg Daily PRN   sodium chloride flush 10 mL PRN   magnesium hydroxide 30 mL Daily PRN   ondansetron 4 mg Q6H PRN   acetaminophen 650 mg Q4H PRN   glucose 15 g PRN   dextrose 12.5 g PRN   glucagon (rDNA) 1 mg PRN   dextrose 100 mL/hr PRN        Objective:    BP (!) 147/65   Pulse 70   Temp 98.2 °F (36.8 °C) (Oral)   Resp 18   Ht 5' 3\" (1.6 m)   Wt 168 lb 11.2 oz (76.5 kg)   SpO2 94%   BMI 29.88 kg/m²   General Appearance: well-developed and well nourished, in no acute distress and alert  Skin: warm and dry  Head: normocephalic and atraumatic  Eyes: pupils equal, round, and reactive to light and conjunctivae normal  ENT: hearing grossly normal bilaterally  Neck: neck supple and non tender without mass

## 2019-08-20 NOTE — PROGRESS NOTES
on insulin    Gout     H/O aortic valve replacement     cow valve    Heart attack ([address]     History of blood transfusion     History of heart surgery     triple bypass 1992;  couple yrs ago (swan) aorta valve surgery    History of stress test     Hx of blood clots     Hyperlipidemia     Hypertension     Hypothyroidism     Intracardiac thrombus 10/2018    Left ventricular systolic dysfunction     severe    Paroxysmal atrial fibrillation (HCC)     PVC's (premature ventricular contractions)     Scarlet fever        Allergies:   Allergies   Allergen Reactions    Niacin And Related Hives    Novolog [Insulin Aspart]     Vitamin E        Current Medications:  Current Facility-Administered Medications   Medication Dose Route Frequency Provider Last Rate Last Dose    clopidogrel (PLAVIX) tablet 75 mg  75 mg Oral Daily Trinidad Big, PA   75 mg at 08/20/19 0827    colchicine (COLCRYS) tablet 0.6 mg  0.6 mg Oral Daily PRN Trinidad Big, PA        docusate sodium (COLACE) capsule 500 mg  500 mg Oral Daily Trinidad Big, PA   500 mg at 08/20/19 0827    FLUoxetine (PROZAC) capsule 20 mg  20 mg Oral Daily Trinidad Big, PA   20 mg at 08/20/19 0827    hydrALAZINE (APRESOLINE) tablet 25 mg  25 mg Oral TID Trinidad Big, PA   25 mg at 08/20/19 0827    insulin detemir (LEVEMIR) injection vial 30 Units  30 Units Subcutaneous BID Trinidad [place], [place]   30 Units at 08/20/19 0828    insulin lispro (HUMALOG) injection vial 0-12 Units  0-12 Units Subcutaneous TID WC Trinidad Big, PA   2 Units at 08/19/19 1217    insulin lispro (HUMALOG) injection vial 0-6 Units  0-6 Units Subcutaneous Nightly Trinidad Big, PA   1 Units at 08/19/19 2213    levothyroxine (SYNTHROID) tablet 125 mcg  125 mcg Oral Daily Trinidad Big, [place]   125 mcg at 08/20/19 0603    metoprolol succinate (TOPROL XL) extended release tablet 75 mg  75 mg Oral Daily Trinidad Big, PA   75 mg at 08/20/19 0827    rosuvastatin (CRESTOR)

## 2019-08-21 VITALS
SYSTOLIC BLOOD PRESSURE: 154 MMHG | OXYGEN SATURATION: 93 % | BODY MASS INDEX: 30.44 KG/M2 | WEIGHT: 171.8 LBS | HEART RATE: 65 BPM | DIASTOLIC BLOOD PRESSURE: 67 MMHG | HEIGHT: 63 IN | RESPIRATION RATE: 20 BRPM | TEMPERATURE: 97.6 F

## 2019-08-21 LAB
ANION GAP SERPL CALCULATED.3IONS-SCNC: 13 MMOL/L (ref 7–16)
BUN BLDV-MCNC: 50 MG/DL (ref 8–23)
CALCIUM SERPL-MCNC: 9.6 MG/DL (ref 8.6–10.2)
CHLORIDE BLD-SCNC: 95 MMOL/L (ref 98–107)
CO2: 26 MMOL/L (ref 22–29)
CREAT SERPL-MCNC: 2.2 MG/DL (ref 0.5–1)
GFR AFRICAN AMERICAN: 26
GFR NON-AFRICAN AMERICAN: 22 ML/MIN/1.73
GLUCOSE BLD-MCNC: 129 MG/DL (ref 74–99)
INR BLD: 1.8
MAGNESIUM: 2 MG/DL (ref 1.6–2.6)
METER GLUCOSE: 138 MG/DL (ref 74–99)
METER GLUCOSE: 161 MG/DL (ref 74–99)
POTASSIUM SERPL-SCNC: 4 MMOL/L (ref 3.5–5)
PROTHROMBIN TIME: 20.3 SEC (ref 9.3–12.4)
REASON FOR REJECTION: NORMAL
REJECTED TEST: NORMAL
SODIUM BLD-SCNC: 134 MMOL/L (ref 132–146)

## 2019-08-21 PROCEDURE — 6370000000 HC RX 637 (ALT 250 FOR IP): Performed by: PHYSICIAN ASSISTANT

## 2019-08-21 PROCEDURE — 36415 COLL VENOUS BLD VENIPUNCTURE: CPT

## 2019-08-21 PROCEDURE — APPSS45 APP SPLIT SHARED TIME 31-45 MINUTES: Performed by: NURSE PRACTITIONER

## 2019-08-21 PROCEDURE — 83735 ASSAY OF MAGNESIUM: CPT

## 2019-08-21 PROCEDURE — 2700000000 HC OXYGEN THERAPY PER DAY

## 2019-08-21 PROCEDURE — 6370000000 HC RX 637 (ALT 250 FOR IP): Performed by: INTERNAL MEDICINE

## 2019-08-21 PROCEDURE — 99239 HOSP IP/OBS DSCHRG MGMT >30: CPT | Performed by: INTERNAL MEDICINE

## 2019-08-21 PROCEDURE — 85610 PROTHROMBIN TIME: CPT

## 2019-08-21 PROCEDURE — 2580000003 HC RX 258: Performed by: PHYSICIAN ASSISTANT

## 2019-08-21 PROCEDURE — 82962 GLUCOSE BLOOD TEST: CPT

## 2019-08-21 PROCEDURE — 80048 BASIC METABOLIC PNL TOTAL CA: CPT

## 2019-08-21 RX ORDER — WARFARIN SODIUM 2 MG/1
2 TABLET ORAL DAILY
Qty: 30 TABLET | Refills: 0 | Status: SHIPPED | OUTPATIENT
Start: 2019-08-21

## 2019-08-21 RX ORDER — BUMETANIDE 1 MG/1
1 TABLET ORAL
Qty: 30 TABLET | Refills: 0 | Status: SHIPPED | OUTPATIENT
Start: 2019-08-21

## 2019-08-21 RX ORDER — WARFARIN SODIUM 3 MG/1
3 TABLET ORAL
Status: DISCONTINUED | OUTPATIENT
Start: 2019-08-21 | End: 2019-08-21 | Stop reason: HOSPADM

## 2019-08-21 RX ADMIN — METOPROLOL SUCCINATE 75 MG: 50 TABLET, EXTENDED RELEASE ORAL at 09:11

## 2019-08-21 RX ADMIN — HYDRALAZINE HYDROCHLORIDE 25 MG: 25 TABLET, FILM COATED ORAL at 09:11

## 2019-08-21 RX ADMIN — INSULIN LISPRO 2 UNITS: 100 INJECTION, SOLUTION INTRAVENOUS; SUBCUTANEOUS at 12:02

## 2019-08-21 RX ADMIN — Medication 1 EACH: at 09:00

## 2019-08-21 RX ADMIN — FLUOXETINE 20 MG: 20 CAPSULE ORAL at 09:11

## 2019-08-21 RX ADMIN — HYDRALAZINE HYDROCHLORIDE 25 MG: 25 TABLET, FILM COATED ORAL at 14:51

## 2019-08-21 RX ADMIN — FENOFIBRATE 160 MG: 160 TABLET ORAL at 09:10

## 2019-08-21 RX ADMIN — PANTOPRAZOLE SODIUM 40 MG: 40 TABLET, DELAYED RELEASE ORAL at 06:15

## 2019-08-21 RX ADMIN — CLOPIDOGREL BISULFATE 75 MG: 75 TABLET ORAL at 09:11

## 2019-08-21 RX ADMIN — Medication 10 ML: at 09:00

## 2019-08-21 RX ADMIN — LEVOTHYROXINE SODIUM 125 MCG: 125 TABLET ORAL at 06:14

## 2019-08-21 RX ADMIN — CETIRIZINE HYDROCHLORIDE 5 MG: 10 TABLET, FILM COATED ORAL at 09:10

## 2019-08-21 RX ADMIN — DOCUSATE SODIUM 500 MG: 100 CAPSULE, LIQUID FILLED ORAL at 09:10

## 2019-08-21 ASSESSMENT — PAIN SCALES - GENERAL: PAINLEVEL_OUTOF10: 0

## 2019-08-21 NOTE — PLAN OF CARE
Problem: Discharge Planning:  Goal: Discharged to appropriate level of care  Description  Discharged to appropriate level of care  Outcome: Met This Shift     Problem: Breathing Pattern - Ineffective:  Goal: Ability to achieve and maintain a regular respiratory rate will improve  Description  Ability to achieve and maintain a regular respiratory rate will improve  Outcome: Met This Shift     Problem: Cardiac:  Goal: Ability to maintain an adequate cardiac output will improve  Description  Ability to maintain an adequate cardiac output will improve  Outcome: Met This Shift     Problem: Fluid Volume:  Goal: Risk for excess fluid volume will decrease  Description  Risk for excess fluid volume will decrease  Outcome: Met This Shift  Goal: Maintenance of adequate hydration will improve  Description  Maintenance of adequate hydration will improve  Outcome: Met This Shift  Goal: Will show no signs and symptoms of electrolyte imbalance  Description  Will show no signs and symptoms of electrolyte imbalance  Outcome: Met This Shift     Problem: Nutritional:  Goal: Maintenance of adequate nutrition will improve  Description  Maintenance of adequate nutrition will improve  Outcome: Met This Shift     Problem: Respiratory:  Goal: Respiratory status will improve  Description  Respiratory status will improve  Outcome: Met This Shift
Problem: Respiratory:  Goal: Respiratory status will improve  Description  Respiratory status will improve  Outcome: Met This Shift
RN  Outcome: Met This Shift     Problem: Respiratory:  Goal: Respiratory status will improve  Description  Respiratory status will improve  8/21/2019 1412 by Herlinda Valentin RN  Outcome: Met This Shift  8/21/2019 1138 by Herlinda Valentin RN  Outcome: Met This Shift

## 2019-08-21 NOTE — PROGRESS NOTES
Warfarin Dose INR Heparin or LMWH HBG/HCT PLT Comment   8/18 2 mg 2.2 --- 11.5/37.4 191    8/19 3 mg X --- 11.1/35.4 180    8/20 3 mg 1.8 --- --- ---    8/21 3 mg 1.8 --- --- ---               Assessment:  · Patient is a 75year old female on chronic anticoagulation for atrial fibrillation and hx of VTE  · Per charting, patient on warfarin 2 mg Tu/Th/Sat/Sun and warfarin 3 mg on Mon/Wed/Fri  · INR goal = 2-3; INR today = 1.8 (subtherapeutic, unchanged from yesterday)    Plan:  · Will give warfarin 3 mg x 1 dose again tonight  · Daily PT/INR until the INR is stable within the therapeutic range  · Pharmacist will follow and monitor/adjust dosing as necessary    Eron Jernigan, PharmD, [facility]  8/21/2019  9:02 AM

## 2019-08-21 NOTE — PATIENT CARE CONFERENCE
[facility] Quality Flow/Interdisciplinary Rounds Progress Note        Quality Flow Rounds held on August 21, 2019    Disciplines Attending:  Bedside Nurse, ,  and Nursing Unit Leadership    Tiana Denny was admitted on 8/18/2019  8:01 AM    Anticipated Discharge Date:  Expected Discharge Date: 08/20/19    Disposition:    Franklin Score:  Franklin Scale Score: 19    Readmission Score:         Discussed patient goal for the day, patient clinical progression, and barriers to discharge.   The following Goal(s) of the Day/Commitment(s) have been identified:  monitor SpO2, discharge planning       Moreno Barraza  August 21, 2019

## 2019-08-22 ENCOUNTER — CARE COORDINATION (OUTPATIENT)
Dept: CASE MANAGEMENT | Age: 74
End: 2019-08-22

## 2019-08-23 ENCOUNTER — CARE COORDINATION (OUTPATIENT)
Dept: CASE MANAGEMENT | Age: 74
End: 2019-08-23

## 2019-09-06 ENCOUNTER — CARE COORDINATION (OUTPATIENT)
Dept: CASE MANAGEMENT | Age: 74
End: 2019-09-06

## 2019-09-06 NOTE — CARE COORDINATION
[facility] Transitions Initial Follow Up Call    Call within 2 business days of discharge: Yes    Patient: Levern Dakins Patient : 1945   MRN: <[id number]>  Reason for Admission: hypoxia  Discharge Date: 19 RARS: Readmission Risk Score: 20      Last Discharge [facility]       Complaint Diagnosis Description Type Department Provider    19 Shortness of Breath Hypoxia . .. ED to Hosp-Admission (Discharged) (ADMITTED) [facility]SB Bala Turpin MD; Angeline Shin-S. .. Facility: [facility]    Attempted to contact patient for transitions call. Contact information left to home VM requesting call back at the earliest convenience. Attempted POA contact, LVM requesting call back. Mehdi Rodriguez RN BSN   Care Transitions Nurse  [phone]         Care Transitions 24 Hour Call    Care Transitions Interventions         Follow Up  No future appointments.     Mehdi Rodriguez RN

## 2019-09-13 ENCOUNTER — CARE COORDINATION (OUTPATIENT)
Dept: CASE MANAGEMENT | Age: 74
End: 2019-09-13

## 2019-10-10 ENCOUNTER — CARE COORDINATION (OUTPATIENT)
Dept: CASE MANAGEMENT | Age: 74
End: 2019-10-10

## 2019-10-28 ENCOUNTER — CARE COORDINATION (OUTPATIENT)
Dept: CASE MANAGEMENT | Age: 74
End: 2019-10-28

## 2019-11-07 ENCOUNTER — CARE COORDINATION (OUTPATIENT)
Dept: CASE MANAGEMENT | Age: 74
End: 2019-11-07

## 2019-11-18 ENCOUNTER — CARE COORDINATION (OUTPATIENT)
Dept: CASE MANAGEMENT | Age: 74
End: 2019-11-18

## 2020-09-10 ENCOUNTER — HOSPITAL ENCOUNTER (EMERGENCY)
Age: 75
Discharge: HOME | End: 2020-09-10
Attending: EMERGENCY MEDICINE
Payer: MEDICARE

## 2020-09-10 ENCOUNTER — APPOINTMENT (OUTPATIENT)
Dept: CT IMAGING | Age: 75
End: 2020-09-10
Payer: MEDICARE

## 2020-09-10 VITALS
DIASTOLIC BLOOD PRESSURE: 87 MMHG | HEART RATE: 78 BPM | SYSTOLIC BLOOD PRESSURE: 157 MMHG | RESPIRATION RATE: 16 BRPM | BODY MASS INDEX: 27.29 KG/M2 | OXYGEN SATURATION: 98 % | WEIGHT: 154 LBS | TEMPERATURE: 97.5 F | HEIGHT: 63 IN

## 2020-09-10 LAB
ALBUMIN SERPL-MCNC: 3.7 G/DL (ref 3.5–5.2)
ALP BLD-CCNC: 144 U/L (ref 35–104)
ALT SERPL-CCNC: 9 U/L (ref 0–32)
ANION GAP SERPL CALCULATED.3IONS-SCNC: 12 MMOL/L (ref 7–16)
ANISOCYTOSIS: ABNORMAL
APTT: 35.9 SEC (ref 24.5–35.1)
AST SERPL-CCNC: 16 U/L (ref 0–31)
BASOPHILS ABSOLUTE: 0.07 E9/L (ref 0–0.2)
BASOPHILS RELATIVE PERCENT: 0.9 % (ref 0–2)
BILIRUB SERPL-MCNC: 0.4 MG/DL (ref 0–1.2)
BUN BLDV-MCNC: 24 MG/DL (ref 8–23)
CALCIUM SERPL-MCNC: 9.8 MG/DL (ref 8.6–10.2)
CHLORIDE BLD-SCNC: 96 MMOL/L (ref 98–107)
CO2: 30 MMOL/L (ref 22–29)
CREAT SERPL-MCNC: 1.5 MG/DL (ref 0.5–1)
EOSINOPHILS ABSOLUTE: 0.19 E9/L (ref 0.05–0.5)
EOSINOPHILS RELATIVE PERCENT: 2.6 % (ref 0–6)
GFR AFRICAN AMERICAN: 41
GFR NON-AFRICAN AMERICAN: 34 ML/MIN/1.73
GLUCOSE BLD-MCNC: 152 MG/DL (ref 74–99)
HCT VFR BLD CALC: 34.3 % (ref 34–48)
HEMOGLOBIN: 9.8 G/DL (ref 11.5–15.5)
HYPOCHROMIA: ABNORMAL
INR BLD: 2.7
LYMPHOCYTES ABSOLUTE: 0.88 E9/L (ref 1.5–4)
LYMPHOCYTES RELATIVE PERCENT: 12.3 % (ref 20–42)
MCH RBC QN AUTO: 21.4 PG (ref 26–35)
MCHC RBC AUTO-ENTMCNC: 28.6 % (ref 32–34.5)
MCV RBC AUTO: 74.7 FL (ref 80–99.9)
MONOCYTES ABSOLUTE: 0.07 E9/L (ref 0.1–0.95)
MONOCYTES RELATIVE PERCENT: 0.9 % (ref 2–12)
NEUTROPHILS ABSOLUTE: 6.06 E9/L (ref 1.8–7.3)
NEUTROPHILS RELATIVE PERCENT: 83.3 % (ref 43–80)
OVALOCYTES: ABNORMAL
PDW BLD-RTO: 18.5 FL (ref 11.5–15)
PLATELET # BLD: 211 E9/L (ref 130–450)
PMV BLD AUTO: 10.6 FL (ref 7–12)
POIKILOCYTES: ABNORMAL
POLYCHROMASIA: ABNORMAL
POTASSIUM REFLEX MAGNESIUM: 3.7 MMOL/L (ref 3.5–5)
PRO-BNP: 2669 PG/ML (ref 0–450)
PROTHROMBIN TIME: 30.9 SEC (ref 9.3–12.4)
RBC # BLD: 4.59 E12/L (ref 3.5–5.5)
SODIUM BLD-SCNC: 138 MMOL/L (ref 132–146)
TOTAL PROTEIN: 7.1 G/DL (ref 6.4–8.3)
TROPONIN: <0.01 NG/ML (ref 0–0.03)
WBC # BLD: 7.3 E9/L (ref 4.5–11.5)

## 2020-09-10 PROCEDURE — 90715 TDAP VACCINE 7 YRS/> IM: CPT | Performed by: STUDENT IN AN ORGANIZED HEALTH CARE EDUCATION/TRAINING PROGRAM

## 2020-09-10 PROCEDURE — 84484 ASSAY OF TROPONIN QUANT: CPT

## 2020-09-10 PROCEDURE — 70450 CT HEAD/BRAIN W/O DYE: CPT

## 2020-09-10 PROCEDURE — 99285 EMERGENCY DEPT VISIT HI MDM: CPT

## 2020-09-10 PROCEDURE — 6370000000 HC RX 637 (ALT 250 FOR IP): Performed by: STUDENT IN AN ORGANIZED HEALTH CARE EDUCATION/TRAINING PROGRAM

## 2020-09-10 PROCEDURE — 80053 COMPREHEN METABOLIC PANEL: CPT

## 2020-09-10 PROCEDURE — 99284 EMERGENCY DEPT VISIT MOD MDM: CPT

## 2020-09-10 PROCEDURE — 85730 THROMBOPLASTIN TIME PARTIAL: CPT

## 2020-09-10 PROCEDURE — 85610 PROTHROMBIN TIME: CPT

## 2020-09-10 PROCEDURE — 85025 COMPLETE CBC W/AUTO DIFF WBC: CPT

## 2020-09-10 PROCEDURE — 6360000002 HC RX W HCPCS: Performed by: STUDENT IN AN ORGANIZED HEALTH CARE EDUCATION/TRAINING PROGRAM

## 2020-09-10 PROCEDURE — 90471 IMMUNIZATION ADMIN: CPT | Performed by: STUDENT IN AN ORGANIZED HEALTH CARE EDUCATION/TRAINING PROGRAM

## 2020-09-10 PROCEDURE — 72125 CT NECK SPINE W/O DYE: CPT

## 2020-09-10 PROCEDURE — 83880 ASSAY OF NATRIURETIC PEPTIDE: CPT

## 2020-09-10 RX ORDER — ACETAMINOPHEN 325 MG/1
650 TABLET ORAL ONCE
Status: COMPLETED | OUTPATIENT
Start: 2020-09-10 | End: 2020-09-10

## 2020-09-10 RX ADMIN — ACETAMINOPHEN 650 MG: 325 TABLET ORAL at 13:53

## 2020-09-10 RX ADMIN — TETANUS TOXOID, REDUCED DIPHTHERIA TOXOID AND ACELLULAR PERTUSSIS VACCINE, ADSORBED 0.5 ML: 5; 2.5; 8; 8; 2.5 SUSPENSION INTRAMUSCULAR at 16:15

## 2020-09-10 ASSESSMENT — ENCOUNTER SYMPTOMS
EYE PAIN: 0
ABDOMINAL DISTENTION: 0
COUGH: 0
SHORTNESS OF BREATH: 0
VOMITING: 0
EYE REDNESS: 0
SINUS PRESSURE: 0
SORE THROAT: 0
BACK PAIN: 0
DIARRHEA: 0
EYE DISCHARGE: 0
NAUSEA: 0
WHEEZING: 0

## 2020-09-10 ASSESSMENT — PAIN SCALES - GENERAL
PAINLEVEL_OUTOF10: 5
PAINLEVEL_OUTOF10: 7

## 2020-09-10 ASSESSMENT — PAIN DESCRIPTION - LOCATION: LOCATION: HEAD

## 2020-09-10 ASSESSMENT — PAIN DESCRIPTION - PAIN TYPE: TYPE: ACUTE PAIN

## 2020-09-10 NOTE — ED PROVIDER NOTES
The patient is a 58-year-old female on blood thinners-Coumadin, and Plavix- who is presenting for a fall after what she thinks was tripping over her sandal in her bank parking lot. She says she fell sideways and hit the back of her head. She says she is in not any pain besides her head, says is a dull ache around the spot where she injured her head. She has no midline neck tenderness, no pain throughout her back. She did not have any loss of consciousness, she remembers before and after the fall. The patient does have a headache which she states is located in posterior aspect of her head. Describes a sharp currently rated 2 out of 10. Nothing makes it better or worse. No treatment prior to arrival.  Symptoms have been constant since onset. She denies any chest pain, shortness of breath, at this time, but she says she does occasionally get palpitations, and shortness of breath, but that did not occur prior to the fall. It would not have prompted her visit to the emergency department. She denies any other associated symptoms. She says nothing feels weak, tingling, or numb. Review of Systems   Constitutional: Negative for chills and fever. HENT: Negative for ear pain, sinus pressure and sore throat. Head pain, located around her occipital hematoma in the posterior right side of her head. Eyes: Negative for pain, discharge and redness. Respiratory: Negative for cough, shortness of breath and wheezing. Cardiovascular: Negative for chest pain. Gastrointestinal: Negative for abdominal distention, diarrhea, nausea and vomiting. Genitourinary: Negative for dysuria and frequency. Musculoskeletal: Negative for arthralgias and back pain. Skin: Negative for rash and wound. Neurological: Negative for weakness and headaches. Hematological: Negative for adenopathy. All other systems reviewed and are negative. Physical Exam  Vitals signs and nursing note reviewed. surgery;  section; Tubal ligation; Carotid endarterectomy; Cholecystectomy (); Hemorrhoid surgery (); Abdominal aortic aneurysm repair; Coronary artery bypass graft (); Coronary angioplasty with stent; Aortic valve replacement; Abdomen surgery; Colonoscopy; Appendectomy (); Cardiac surgery (); and Dilatation, esophagus. Social History:  reports that she quit smoking about 28 years ago. She has never used smokeless tobacco. She reports that she does not drink alcohol or use drugs. Family History: family history includes Cancer in her mother; Heart Attack in her father and sister; Other in her brother. The patients home medications have been reviewed.     Allergies: Niacin and related; Novolog [insulin aspart]; and Vitamin e    -------------------------------------------------- RESULTS -------------------------------------------------  Labs:  Results for orders placed or performed during the hospital encounter of 09/10/20   Protime-INR   Result Value Ref Range    Protime 30.9 (H) 9.3 - 12.4 sec    INR 2.7    APTT   Result Value Ref Range    aPTT 35.9 (H) 24.5 - 35.1 sec   Comprehensive Metabolic Panel w/ Reflex to MG   Result Value Ref Range    Sodium 138 132 - 146 mmol/L    Potassium reflex Magnesium 3.7 3.5 - 5.0 mmol/L    Chloride 96 (L) 98 - 107 mmol/L    CO2 30 (H) 22 - 29 mmol/L    Anion Gap 12 7 - 16 mmol/L    Glucose 152 (H) 74 - 99 mg/dL    BUN 24 (H) 8 - 23 mg/dL    CREATININE 1.5 (H) 0.5 - 1.0 mg/dL    GFR Non-African American 34 >=60 mL/min/1.73    GFR African American 41     Calcium 9.8 8.6 - 10.2 mg/dL    Total Protein 7.1 6.4 - 8.3 g/dL    Alb 3.7 3.5 - 5.2 g/dL    Total Bilirubin 0.4 0.0 - 1.2 mg/dL    Alkaline Phosphatase 144 (H) 35 - 104 U/L    ALT 9 0 - 32 U/L    AST 16 0 - 31 U/L   CBC Auto Differential   Result Value Ref Range    WBC 7.3 4.5 - 11.5 E9/L    RBC 4.59 3.50 - 5.50 E12/L    Hemoglobin 9.8 (L) 11.5 - 15.5 g/dL    Hematocrit 34.3 34.0 - 48.0 %    MCV 74.7 (L) 80.0 - 99.9 fL    MCH 21.4 (L) 26.0 - 35.0 pg    MCHC 28.6 (L) 32.0 - 34.5 %    RDW 18.5 (H) 11.5 - 15.0 fL    Platelets 231 927 - 327 E9/L    MPV 10.6 7.0 - 12.0 fL    Neutrophils % 83.3 (H) 43.0 - 80.0 %    Lymphocytes % 12.3 (L) 20.0 - 42.0 %    Monocytes % 0.9 (L) 2.0 - 12.0 %    Eosinophils % 2.6 0.0 - 6.0 %    Basophils % 0.9 0.0 - 2.0 %    Neutrophils Absolute 6.06 1.80 - 7.30 E9/L    Lymphocytes Absolute 0.88 (L) 1.50 - 4.00 E9/L    Monocytes Absolute 0.07 (L) 0.10 - 0.95 E9/L    Eosinophils Absolute 0.19 0.05 - 0.50 E9/L    Basophils Absolute 0.07 0.00 - 0.20 E9/L    Anisocytosis 2+     Polychromasia 1+     Hypochromia 1+     Poikilocytes 2+     Ovalocytes 2+    Troponin   Result Value Ref Range    Troponin <0.01 0.00 - 0.03 ng/mL   Brain Natriuretic Peptide   Result Value Ref Range    Pro-BNP 2,669 (H) 0 - 450 pg/mL       Radiology:  CT HEAD WO CONTRAST   Final Result   Mild atrophy and right occipital scalp hematoma. CT CERVICAL SPINE WO CONTRAST   Final Result   Degenerative changes          ------------------------- NURSING NOTES AND VITALS REVIEWED ---------------------------  Date / Time Roomed:  9/10/2020 12:48 PM  ED Bed Assignment:  DEL CID D/JIMY    The nursing notes within the ED encounter and vital signs as below have been reviewed. BP (!) 157/87   Pulse 78   Temp 97.5 °F (36.4 °C)   Resp 16   Ht 5' 3\" (1.6 m)   Wt 154 lb (69.9 kg)   SpO2 98%   BMI 27.28 kg/m²   Oxygen Saturation Interpretation: Normal      ------------------------------------------ PROGRESS NOTES ------------------------------------------  10:31 PM EDT  I have spoken with the patient and discussed todays results, in addition to providing specific details for the plan of care and counseling regarding the diagnosis and prognosis. Their questions are answered at this time and they are agreeable with the plan. I discussed at length with them reasons for immediate return here for re evaluation.  They will followup cow valve    [facility] [address] [address]     History of blood transfusion     History of heart surgery     triple bypass 1992;  couple yrs ago (swan) aorta valve surgery    History of stress test     Hx of blood clots     Hyperlipidemia     Hypertension     Hypothyroidism     Intracardiac thrombus 10/2018    Left ventricular systolic dysfunction     severe    Paroxysmal atrial fibrillation (HCC)     PVC's (premature ventricular contractions)     Scarlet fever      presenting to the emergency department with a chief complaint of fall from standing. Patient reports that she tripped over her sandal.  She did hit her head. Differential diagnosis includes closed head injury, contusion, subdural, epidural.  The patient did have labs and imaging which were reviewed. The patient was treated symptomatically. CT had negative aside from right occipital scalp hematoma. CT C-spine negative for any acute findings. Labs grossly at the patient's baseline. Patient be discharged and follow-up outpatient. My findings/plan: The primary encounter diagnosis was Fall, initial encounter. A diagnosis of Hematoma of scalp, initial encounter was also pertinent to this visit.   Discharge Medication List as of 9/10/2020  4:23 PM        Estevan Rosales, [address] [facility],   09/13/20 0895

## 2020-11-03 PROBLEM — E03.9 ACQUIRED HYPOTHYROIDISM: Status: RESOLVED | Noted: 2017-07-13 | Resolved: 2020-11-03

## 2020-12-30 ENCOUNTER — APPOINTMENT (OUTPATIENT)
Dept: ULTRASOUND IMAGING | Age: 75
End: 2020-12-30
Payer: MEDICARE

## 2020-12-30 ENCOUNTER — APPOINTMENT (OUTPATIENT)
Dept: GENERAL RADIOLOGY | Age: 75
End: 2020-12-30
Payer: MEDICARE

## 2020-12-30 ENCOUNTER — HOSPITAL ENCOUNTER (EMERGENCY)
Age: 75
Discharge: HOME | End: 2020-12-30
Attending: EMERGENCY MEDICINE
Payer: MEDICARE

## 2020-12-30 VITALS
WEIGHT: 148 LBS | TEMPERATURE: 98 F | HEART RATE: 63 BPM | OXYGEN SATURATION: 98 % | BODY MASS INDEX: 26.22 KG/M2 | SYSTOLIC BLOOD PRESSURE: 152 MMHG | HEIGHT: 63 IN | RESPIRATION RATE: 20 BRPM | DIASTOLIC BLOOD PRESSURE: 67 MMHG

## 2020-12-30 LAB
ACANTHOCYTES: ABNORMAL
ANION GAP SERPL CALCULATED.3IONS-SCNC: 8 MMOL/L (ref 7–16)
ANISOCYTOSIS: ABNORMAL
APTT: 30.4 SEC (ref 24.5–35.1)
BACTERIA: ABNORMAL /HPF
BASOPHILS ABSOLUTE: 0.04 E9/L (ref 0–0.2)
BASOPHILS RELATIVE PERCENT: 0.7 % (ref 0–2)
BILIRUBIN URINE: NEGATIVE
BLOOD, URINE: NEGATIVE
BUN BLDV-MCNC: 29 MG/DL (ref 8–23)
CALCIUM SERPL-MCNC: 10 MG/DL (ref 8.6–10.2)
CHLORIDE BLD-SCNC: 99 MMOL/L (ref 98–107)
CLARITY: CLEAR
CO2: 29 MMOL/L (ref 22–29)
COLOR: YELLOW
CREAT SERPL-MCNC: 1.6 MG/DL (ref 0.5–1)
EOSINOPHILS ABSOLUTE: 0.21 E9/L (ref 0.05–0.5)
EOSINOPHILS RELATIVE PERCENT: 3.5 % (ref 0–6)
GFR AFRICAN AMERICAN: 38
GFR NON-AFRICAN AMERICAN: 31 ML/MIN/1.73
GLUCOSE BLD-MCNC: 137 MG/DL (ref 74–99)
GLUCOSE URINE: NEGATIVE MG/DL
HCT VFR BLD CALC: 36.2 % (ref 34–48)
HEMOGLOBIN: 10.1 G/DL (ref 11.5–15.5)
HYPOCHROMIA: ABNORMAL
IMMATURE GRANULOCYTES #: 0.02 E9/L
IMMATURE GRANULOCYTES %: 0.3 % (ref 0–5)
INR BLD: 1.6
KETONES, URINE: NEGATIVE MG/DL
LEUKOCYTE ESTERASE, URINE: ABNORMAL
LYMPHOCYTES ABSOLUTE: 0.93 E9/L (ref 1.5–4)
LYMPHOCYTES RELATIVE PERCENT: 15.4 % (ref 20–42)
MCH RBC QN AUTO: 19.7 PG (ref 26–35)
MCHC RBC AUTO-ENTMCNC: 27.9 % (ref 32–34.5)
MCV RBC AUTO: 70.7 FL (ref 80–99.9)
MONOCYTES ABSOLUTE: 0.46 E9/L (ref 0.1–0.95)
MONOCYTES RELATIVE PERCENT: 7.6 % (ref 2–12)
NEUTROPHILS ABSOLUTE: 4.37 E9/L (ref 1.8–7.3)
NEUTROPHILS RELATIVE PERCENT: 72.5 % (ref 43–80)
NITRITE, URINE: NEGATIVE
OVALOCYTES: ABNORMAL
PDW BLD-RTO: 21.1 FL (ref 11.5–15)
PH UA: 5.5 (ref 5–9)
PLATELET # BLD: 227 E9/L (ref 130–450)
PMV BLD AUTO: 10.2 FL (ref 7–12)
POIKILOCYTES: ABNORMAL
POLYCHROMASIA: ABNORMAL
POTASSIUM SERPL-SCNC: 4.2 MMOL/L (ref 3.5–5)
PRO-BNP: 8297 PG/ML (ref 0–450)
PROTEIN UA: 30 MG/DL
PROTHROMBIN TIME: 18.9 SEC (ref 9.3–12.4)
RBC # BLD: 5.12 E12/L (ref 3.5–5.5)
RBC UA: ABNORMAL /HPF (ref 0–2)
SCHISTOCYTES: ABNORMAL
SODIUM BLD-SCNC: 136 MMOL/L (ref 132–146)
SPECIFIC GRAVITY UA: 1.01 (ref 1–1.03)
TROPONIN: <0.01 NG/ML (ref 0–0.03)
UROBILINOGEN, URINE: 0.2 E.U./DL
WBC # BLD: 6 E9/L (ref 4.5–11.5)
WBC UA: ABNORMAL /HPF (ref 0–5)

## 2020-12-30 PROCEDURE — 99284 EMERGENCY DEPT VISIT MOD MDM: CPT

## 2020-12-30 PROCEDURE — 93005 ELECTROCARDIOGRAM TRACING: CPT | Performed by: NURSE PRACTITIONER

## 2020-12-30 PROCEDURE — 84484 ASSAY OF TROPONIN QUANT: CPT

## 2020-12-30 PROCEDURE — 6360000002 HC RX W HCPCS: Performed by: EMERGENCY MEDICINE

## 2020-12-30 PROCEDURE — 85610 PROTHROMBIN TIME: CPT

## 2020-12-30 PROCEDURE — 93971 EXTREMITY STUDY: CPT

## 2020-12-30 PROCEDURE — 85025 COMPLETE CBC W/AUTO DIFF WBC: CPT

## 2020-12-30 PROCEDURE — 80048 BASIC METABOLIC PNL TOTAL CA: CPT

## 2020-12-30 PROCEDURE — 83880 ASSAY OF NATRIURETIC PEPTIDE: CPT

## 2020-12-30 PROCEDURE — 85730 THROMBOPLASTIN TIME PARTIAL: CPT

## 2020-12-30 PROCEDURE — 71045 X-RAY EXAM CHEST 1 VIEW: CPT

## 2020-12-30 PROCEDURE — 96374 THER/PROPH/DIAG INJ IV PUSH: CPT

## 2020-12-30 PROCEDURE — 81001 URINALYSIS AUTO W/SCOPE: CPT

## 2020-12-30 RX ORDER — FUROSEMIDE 10 MG/ML
40 INJECTION INTRAMUSCULAR; INTRAVENOUS ONCE
Status: COMPLETED | OUTPATIENT
Start: 2020-12-30 | End: 2020-12-30

## 2020-12-30 RX ADMIN — FUROSEMIDE 40 MG: 10 INJECTION, SOLUTION INTRAMUSCULAR; INTRAVENOUS at 12:52

## 2020-12-30 NOTE — ED PROVIDER NOTES
ED Attending  CC: Franchesca Stiles  Department of Emergency Medicine   ED  Encounter Note  Admit Date/RoomTime: 2020  9:55 AM  ED Room:     NAME: Nino Lyon  : 1945  MRN: [id number]     Chief Complaint:  Leg Swelling (right leg pain and swelling )    HISTORY OF PRESENT ILLNESS        Nino Lyon is a 67 y.o. female who presents to the ED by private vehicle with her daughter for complaints of right lower leg swelling, erythema and pain beginning for 5 days prior to arrival.  Patient states the swelling in the leg started approximately 2 months ago. She is on Coumadin and has not had a recent level checked. Patient denies any chest pain, palpitations, shortness of breath, abdominal pain, fever, chills, nausea or vomiting. She wears 2 L of nasal cannula continuously at home. The complaint has been persistent and worsening and are moderate in severity. Patient appears to be well and denies any bloody or tarry stools. ROS   Pertinent positives and negatives are stated within HPI, all other systems reviewed and are negative. Past Medical History:  has a past medical history of Bradycardia, CAD (coronary artery disease), Chest pain, CHF (congestive heart failure) ([address] CKD (chronic kidney disease), DM2 (diabetes mellitus, type 2) ([address] Gout, H/O aortic valve replacement, Heart attack ([address] History of blood transfusion, History of heart surgery, History of stress test, Hx of blood clots, Hyperlipidemia, Hypertension, Hypothyroidism, Intracardiac thrombus, Left ventricular systolic dysfunction, Paroxysmal atrial fibrillation ([address] PVC's (premature ventricular contractions), and Scarlet fever. Surgical History:  has a past surgical history that includes Small intestine surgery;  section; Tubal ligation; Carotid endarterectomy; Cholecystectomy (); Hemorrhoid surgery (); Abdominal aortic aneurysm repair; Coronary artery bypass graft (); Coronary angioplasty with stent; Aortic valve replacement; Abdomen surgery; Colonoscopy; Appendectomy (); Cardiac surgery (); and Dilatation, esophagus. Social History:  reports that she quit smoking about 29 years ago. She has never used smokeless tobacco. She reports that she does not drink alcohol or use drugs. Family History: family history includes Cancer in her mother; Heart Attack in her father and sister; Other in her brother. Allergies: Niacin and related, Novolog [insulin aspart], and Vitamin e    PHYSICAL EXAM   Oxygen Saturation Interpretation: Normal on 2 L NC.        ED Triage Vitals   BP Temp Temp Source Pulse Resp SpO2 Height Weight   20 1007 20 0953 20 1007 20 0953 20 1007 20 0953 20 1007 20 1007   (!) 163/74 96.9 °F (36.1 °C) Oral 78 18 98% 5' 3\" (1.6 m) 148 lb (67.1 kg)         Physical Exam  Constitutional/General: Alert and oriented x3, well appearing, non toxic. HEENT:  NC/NT. PERRLA,  Airway patent. Neck: Supple, full ROM, non tender to palpation in the midline, no stridor, no crepitus, no meningeal signs  Respiratory: Lungs clear to auscultation bilaterally, no wheezes, or rhonchi. Rales noted bibasilar. Not in respiratory distress  CV:  Regular rate. Regular rhythm with murmur. No gallops, or rubs. 2+ distal pulses  Chest: No chest wall tenderness  GI:  Abdomen Soft, Non tender, Non distended. +BS. No rebound, guarding, or rigidity. No pulsatile masses. Musculoskeletal: Moves all extremities x 4. Warm and well perfused, no clubbing, cyanosis, or edema. Capillary refill <3 seconds. Erythema noted to the distal tibia 2+ pedal and posterior tibial pulses intact calf tenderness and 1 + swelling noted. Integument: skin warm and dry. No rashes. Lymphatic: no lymphadenopathy noted  Neurologic: GCS 15, no focal deficits, symmetric strength 5/5 in the upper and lower extremities bilaterally. Cranial nerves II through XII grossly intact.   Psychiatric: Normal Affect      Lab / Imaging Results   (All laboratory and radiology results have been personally reviewed by myself)  Labs:  Results for orders placed or performed during the hospital encounter of 12/30/20   CBC Auto Differential   Result Value Ref Range    WBC 6.0 4.5 - 11.5 E9/L    RBC 5.12 3.50 - 5.50 E12/L    Hemoglobin 10.1 (L) 11.5 - 15.5 g/dL    Hematocrit 36.2 34.0 - 48.0 %    MCV 70.7 (L) 80.0 - 99.9 fL    MCH 19.7 (L) 26.0 - 35.0 pg    MCHC 27.9 (L) 32.0 - 34.5 %    RDW 21.1 (H) 11.5 - 15.0 fL    Platelets 619 825 - 797 E9/L    MPV 10.2 7.0 - 12.0 fL    Neutrophils % 72.5 43.0 - 80.0 %    Immature Granulocytes % 0.3 0.0 - 5.0 %    Lymphocytes % 15.4 (L) 20.0 - 42.0 %    Monocytes % 7.6 2.0 - 12.0 %    Eosinophils % 3.5 0.0 - 6.0 %    Basophils % 0.7 0.0 - 2.0 %    Neutrophils Absolute 4.37 1.80 - 7.30 E9/L    Immature Granulocytes # 0.02 E9/L    Lymphocytes Absolute 0.93 (L) 1.50 - 4.00 E9/L    Monocytes Absolute 0.46 0.10 - 0.95 E9/L    Eosinophils Absolute 0.21 0.05 - 0.50 E9/L    Basophils Absolute 0.04 0.00 - 0.20 E9/L    Anisocytosis 2+     Polychromasia 1+     Hypochromia 1+     Poikilocytes 3+     Schistocytes 1+     Acanthocytes 1+     Ovalocytes 2+    Basic Metabolic Panel   Result Value Ref Range    Sodium 136 132 - 146 mmol/L    Potassium 4.2 3.5 - 5.0 mmol/L    Chloride 99 98 - 107 mmol/L    CO2 29 22 - 29 mmol/L    Anion Gap 8 7 - 16 mmol/L    Glucose 137 (H) 74 - 99 mg/dL BUN 29 (H) 8 - 23 mg/dL    CREATININE 1.6 (H) 0.5 - 1.0 mg/dL    GFR Non-African American 31 >=60 mL/min/1.73    GFR African American 38     Calcium 10.0 8.6 - 10.2 mg/dL   Brain Natriuretic Peptide   Result Value Ref Range    Pro-BNP 8,297 (H) 0 - 450 pg/mL   Troponin   Result Value Ref Range    Troponin <0.01 0.00 - 0.03 ng/mL   Urinalysis   Result Value Ref Range    Color, UA Yellow Straw/Yellow    Clarity, UA Clear Clear    Glucose, Ur Negative Negative mg/dL    Bilirubin Urine Negative Negative    Ketones, Urine Negative Negative mg/dL    Specific Gravity, UA 1.015 1.005 - 1.030    Blood, Urine Negative Negative    pH, UA 5.5 5.0 - 9.0    Protein, UA 30 (A) Negative mg/dL    Urobilinogen, Urine 0.2 <2.0 E.U./dL    Nitrite, Urine Negative Negative    Leukocyte Esterase, Urine SMALL (A) Negative   APTT   Result Value Ref Range    aPTT 30.4 24.5 - 35.1 sec   Protime-INR   Result Value Ref Range    Protime 18.9 (H) 9.3 - 12.4 sec    INR 1.6    Microscopic Urinalysis   Result Value Ref Range    WBC, UA 2-5 0 - 5 /HPF    RBC, UA 0-1 0 - 2 /HPF    Bacteria, UA FEW (A) None Seen /HPF   EKG 12 Lead   Result Value Ref Range    Ventricular Rate 65 BPM    Atrial Rate 65 BPM    P-R Interval 202 ms    QRS Duration 116 ms    Q-T Interval 414 ms    QTc Calculation (Bazett) 430 ms    P Axis 67 degrees    R Axis 57 degrees    T Axis 169 degrees     Imaging: All Radiology results interpreted by Radiologist unless otherwise noted. XR CHEST PORTABLE   Final Result   CHF with pulmonary edema and likely pleural effusions. US DUP LOWER EXTREMITY RIGHT ZACARIAS   Final Result   No evidence of DVT in the right lower extremity. EKG #1:  Interpreted by emergency department physician unless otherwise noted. Time:  1248    Rate: 65 bpm  Rhythm: Sinus rhythm. Interpretation: Normal sinus rhythm. Anterolateral infarct,age undetermined; T wave abnormality; consider inferior ischemia. Comparison: Today's ECG is unchanged from previous tracings. 3/6/19. ED Course / Medical Decision Making     Medications   furosemide (LASIX) injection 40 mg (40 mg Intravenous Given 12/30/20 1252)        Re-Evaluations:  12/30/20      Time: 1200 Dr. Olson Client into examine patient and ordered lasix. 1235 Discussed findings with patient and instructed her to double her dose of Bumex for the next 2 days and to double her dose of Coumadin today and tomorrow and will give her outpatient testing and should call her PCP. Patient reports she takes 2 mg daily except on Tuesdays and Saturdays which she takes 4 mg. Consultations:             None    Procedures:   none    MDM: EKG negative for acute findings. Ultrasound was negative for DVT. WBC 6.0 by: Hgb 10.1; BUN 29; creatinine 1.6 which has been stable. BNP was 8297 was given an extra dose of Lasix IV in the ED today. Chest x-ray reveals CHF with pulmonary edema likely pleural effusions. Urinalysis shows few bacteria 2-5 WBCs and small amount of leukocytes and has no urinary symptoms at this time. Patient's INR is 1.6 and was instructed to double her dose of Coumadin Wednesday and Thursday and resume her normal dose on Friday and will give her a prescription for a repeat INR and she should call her PCP for reevaluation. Patient also instructed to take double dose of Bumex for the next 2 days. Patient advised on signs and symptoms warranting immediate return to the ED for reevaluation at any time. Patient is afebrile, nontoxic in appearance, hemodynamically stable for discharge and has continuous oxygen for home nasal cannula. She was advised on signs and symptoms warranting immediate return and follow-up with her PCP. Plan of Care/Counseling:  I reviewed today's visit with the patient in addition to providing specific details for the plan of care and counseling regarding the diagnosis and prognosis. Questions are answered at this time and are agreeable with the plan. ASSESSMENT     1. Acute on chronic heart failure, unspecified heart failure type (HCC)    2. Pain and swelling of lower leg, right    3. Subtherapeutic international normalized ratio (INR)      This patient's ED course included: a personal history and physicial examination, IV medications and complex medical decision making and emergency management  This patient has remained hemodynamically stable and been closely monitored during their ED course. PLAN   Discharge to home. Patient condition is good. New Medications     New Prescriptions    No medications on file     Electronically signed by WOO Garcia CNP   DD: 12/30/20  **This report was transcribed using voice recognition software. Every effort was made to ensure accuracy; however, inadvertent computerized transcription errors may be present.   END OF PROVIDER NOTE          WOO Garcia CNP  12/30/20 6251

## 2020-12-31 LAB
EKG ATRIAL RATE: 65 BPM
EKG P AXIS: 67 DEGREES
EKG P-R INTERVAL: 202 MS
EKG Q-T INTERVAL: 414 MS
EKG QRS DURATION: 116 MS
EKG QTC CALCULATION (BAZETT): 430 MS
EKG R AXIS: 57 DEGREES
EKG T AXIS: 169 DEGREES
EKG VENTRICULAR RATE: 65 BPM

## 2021-01-02 ENCOUNTER — HOSPITAL ENCOUNTER (OUTPATIENT)
Age: 76
Discharge: HOME | End: 2021-01-02
Payer: MEDICARE

## 2021-01-02 DIAGNOSIS — R79.1 SUBTHERAPEUTIC INTERNATIONAL NORMALIZED RATIO (INR): ICD-10-CM

## 2021-01-02 LAB
INR BLD: 2.1
PROTHROMBIN TIME: 24.4 SEC (ref 9.3–12.4)

## 2021-01-02 PROCEDURE — 36415 COLL VENOUS BLD VENIPUNCTURE: CPT

## 2021-01-02 PROCEDURE — 85610 PROTHROMBIN TIME: CPT

## 2021-06-21 LAB
HCT VFR BLD CALC: 36.2 % (ref 36–44)
HEMOGLOBIN: 12.3 G/DL (ref 12–15)

## 2021-07-21 LAB
HCT VFR BLD CALC: 29.1 % (ref 36–44)
HEMOGLOBIN: 9.9 G/DL (ref 12–15)

## 2021-08-20 LAB
HCT VFR BLD CALC: 32.2 % (ref 36–44)
HEMOGLOBIN: 11 G/DL (ref 12–15)